# Patient Record
Sex: FEMALE | Race: BLACK OR AFRICAN AMERICAN | Employment: UNEMPLOYED | URBAN - METROPOLITAN AREA
[De-identification: names, ages, dates, MRNs, and addresses within clinical notes are randomized per-mention and may not be internally consistent; named-entity substitution may affect disease eponyms.]

---

## 2017-06-26 ENCOUNTER — HOSPITAL ENCOUNTER (OUTPATIENT)
Age: 42
Setting detail: SPECIMEN
Discharge: HOME | End: 2017-06-26

## 2017-06-27 LAB
C TRACH DNA GENITAL QL NAA+PROBE: NEGATIVE
CYTOLOGY REPORT: NORMAL
HPV SAMPLE: NORMAL
HPV SOURCE: NORMAL
HPV, GENOTYPE 16: NOT DETECTED
HPV, GENOTYPE 18: NOT DETECTED
HPV, HIGH RISK OTHER: NOT DETECTED
HPV, INTERPRETATION: NORMAL
N. GONORRHOEAE DNA: NEGATIVE

## 2018-07-05 PROBLEM — F29 PSYCHOSES (HCC): Status: ACTIVE | Noted: 2018-07-05

## 2019-02-15 ENCOUNTER — HOSPITAL ENCOUNTER (OUTPATIENT)
Age: 44
Setting detail: SPECIMEN
Discharge: HOME | End: 2019-02-15
Payer: MEDICARE

## 2019-02-15 DIAGNOSIS — Z13.29 SCREENING FOR THYROID DISORDER: ICD-10-CM

## 2019-02-15 DIAGNOSIS — Z13.1 SCREENING FOR DIABETES MELLITUS: ICD-10-CM

## 2019-02-15 DIAGNOSIS — Z51.81 MEDICATION MONITORING ENCOUNTER: ICD-10-CM

## 2019-02-15 DIAGNOSIS — Z13.220 SCREENING FOR LIPID DISORDERS: ICD-10-CM

## 2019-02-15 DIAGNOSIS — Z13.21 ENCOUNTER FOR VITAMIN DEFICIENCY SCREENING: ICD-10-CM

## 2019-02-15 DIAGNOSIS — E78.5 DYSLIPIDEMIA: ICD-10-CM

## 2019-02-15 LAB
ALBUMIN SERPL-MCNC: 4.1 G/DL (ref 3.5–5.2)
ALBUMIN/GLOBULIN RATIO: 1.4 (ref 1–2.5)
ALP BLD-CCNC: 76 U/L (ref 35–104)
ALT SERPL-CCNC: 8 U/L (ref 5–33)
ANION GAP SERPL CALCULATED.3IONS-SCNC: 10 MMOL/L (ref 9–17)
AST SERPL-CCNC: 8 U/L
BILIRUB SERPL-MCNC: 0.76 MG/DL (ref 0.3–1.2)
BUN BLDV-MCNC: 6 MG/DL (ref 6–20)
BUN/CREAT BLD: NORMAL (ref 9–20)
CALCIUM SERPL-MCNC: 9 MG/DL (ref 8.6–10.4)
CHLORIDE BLD-SCNC: 106 MMOL/L (ref 98–107)
CHOLESTEROL/HDL RATIO: 3.3
CHOLESTEROL: 137 MG/DL
CO2: 24 MMOL/L (ref 20–31)
CREAT SERPL-MCNC: 0.79 MG/DL (ref 0.5–0.9)
GFR AFRICAN AMERICAN: >60 ML/MIN
GFR NON-AFRICAN AMERICAN: >60 ML/MIN
GFR SERPL CREATININE-BSD FRML MDRD: NORMAL ML/MIN/{1.73_M2}
GFR SERPL CREATININE-BSD FRML MDRD: NORMAL ML/MIN/{1.73_M2}
GLUCOSE FASTING: 89 MG/DL (ref 70–99)
HCT VFR BLD CALC: 44.7 % (ref 36.3–47.1)
HDLC SERPL-MCNC: 42 MG/DL
HEMOGLOBIN: 15 G/DL (ref 11.9–15.1)
LDL CHOLESTEROL: 83 MG/DL (ref 0–130)
MCH RBC QN AUTO: 31.5 PG (ref 25.2–33.5)
MCHC RBC AUTO-ENTMCNC: 33.6 G/DL (ref 28.4–34.8)
MCV RBC AUTO: 93.9 FL (ref 82.6–102.9)
NRBC AUTOMATED: 0 PER 100 WBC
PDW BLD-RTO: 13.2 % (ref 11.8–14.4)
PLATELET # BLD: 201 K/UL (ref 138–453)
PMV BLD AUTO: 9.9 FL (ref 8.1–13.5)
POTASSIUM SERPL-SCNC: 4.6 MMOL/L (ref 3.7–5.3)
RBC # BLD: 4.76 M/UL (ref 3.95–5.11)
SODIUM BLD-SCNC: 140 MMOL/L (ref 135–144)
TOTAL PROTEIN: 7.1 G/DL (ref 6.4–8.3)
TRIGL SERPL-MCNC: 58 MG/DL
TSH SERPL DL<=0.05 MIU/L-ACNC: 1.2 MIU/L (ref 0.3–5)
VITAMIN B-12: 438 PG/ML (ref 232–1245)
VITAMIN D 25-HYDROXY: 14.4 NG/ML (ref 30–100)
VLDLC SERPL CALC-MCNC: NORMAL MG/DL (ref 1–30)
WBC # BLD: 3.3 K/UL (ref 3.5–11.3)

## 2019-09-25 ENCOUNTER — HOSPITAL ENCOUNTER (OUTPATIENT)
Dept: GENERAL RADIOLOGY | Facility: CLINIC | Age: 44
Discharge: HOME | End: 2019-09-27
Payer: MEDICARE

## 2019-09-25 DIAGNOSIS — M54.41 ACUTE MIDLINE LOW BACK PAIN WITH RIGHT-SIDED SCIATICA: ICD-10-CM

## 2019-09-25 PROCEDURE — 72100 X-RAY EXAM L-S SPINE 2/3 VWS: CPT

## 2020-10-07 PROBLEM — E66.01 MORBID OBESITY WITH BMI OF 40.0-44.9, ADULT (HCC): Status: ACTIVE | Noted: 2020-10-07

## 2020-10-07 PROBLEM — F29 PSYCHOSES (HCC): Status: RESOLVED | Noted: 2018-07-05 | Resolved: 2020-10-07

## 2020-10-14 ENCOUNTER — HOSPITAL ENCOUNTER (OUTPATIENT)
Age: 45
Setting detail: SPECIMEN
Discharge: HOME | End: 2020-10-14
Payer: MEDICARE

## 2020-10-14 LAB
ABSOLUTE EOS #: 0.21 K/UL (ref 0–0.44)
ABSOLUTE IMMATURE GRANULOCYTE: <0.03 K/UL (ref 0–0.3)
ABSOLUTE LYMPH #: 1.66 K/UL (ref 1.1–3.7)
ABSOLUTE MONO #: 0.44 K/UL (ref 0.1–1.2)
ALBUMIN SERPL-MCNC: 4 G/DL (ref 3.5–5.2)
ALBUMIN/GLOBULIN RATIO: 1.2 (ref 1–2.5)
ALP BLD-CCNC: 97 U/L (ref 35–104)
ALT SERPL-CCNC: <5 U/L (ref 5–33)
ANION GAP SERPL CALCULATED.3IONS-SCNC: 12 MMOL/L (ref 9–17)
AST SERPL-CCNC: 11 U/L
BASOPHILS # BLD: 1 % (ref 0–2)
BASOPHILS ABSOLUTE: 0.03 K/UL (ref 0–0.2)
BILIRUB SERPL-MCNC: 0.73 MG/DL (ref 0.3–1.2)
BUN BLDV-MCNC: 8 MG/DL (ref 6–20)
BUN/CREAT BLD: ABNORMAL (ref 9–20)
CALCIUM SERPL-MCNC: 9.2 MG/DL (ref 8.6–10.4)
CHLORIDE BLD-SCNC: 105 MMOL/L (ref 98–107)
CHOLESTEROL/HDL RATIO: 3.7
CHOLESTEROL: 157 MG/DL
CO2: 23 MMOL/L (ref 20–31)
CREAT SERPL-MCNC: 0.83 MG/DL (ref 0.5–0.9)
DIFFERENTIAL TYPE: ABNORMAL
EOSINOPHILS RELATIVE PERCENT: 5 % (ref 1–4)
GFR AFRICAN AMERICAN: >60 ML/MIN
GFR NON-AFRICAN AMERICAN: >60 ML/MIN
GFR SERPL CREATININE-BSD FRML MDRD: ABNORMAL ML/MIN/{1.73_M2}
GFR SERPL CREATININE-BSD FRML MDRD: ABNORMAL ML/MIN/{1.73_M2}
GLUCOSE FASTING: 87 MG/DL (ref 70–99)
HCT VFR BLD CALC: 47.4 % (ref 36.3–47.1)
HDLC SERPL-MCNC: 42 MG/DL
HEMOGLOBIN: 15.6 G/DL (ref 11.9–15.1)
IMMATURE GRANULOCYTES: 0 %
LDL CHOLESTEROL: 99 MG/DL (ref 0–130)
LYMPHOCYTES # BLD: 37 % (ref 24–43)
MCH RBC QN AUTO: 31.8 PG (ref 25.2–33.5)
MCHC RBC AUTO-ENTMCNC: 32.9 G/DL (ref 28.4–34.8)
MCV RBC AUTO: 96.5 FL (ref 82.6–102.9)
MONOCYTES # BLD: 10 % (ref 3–12)
NRBC AUTOMATED: 0 PER 100 WBC
PDW BLD-RTO: 13 % (ref 11.8–14.4)
PLATELET # BLD: 249 K/UL (ref 138–453)
PLATELET ESTIMATE: ABNORMAL
PMV BLD AUTO: 9.6 FL (ref 8.1–13.5)
POTASSIUM SERPL-SCNC: 4.5 MMOL/L (ref 3.7–5.3)
RBC # BLD: 4.91 M/UL (ref 3.95–5.11)
RBC # BLD: ABNORMAL 10*6/UL
SEG NEUTROPHILS: 47 % (ref 36–65)
SEGMENTED NEUTROPHILS ABSOLUTE COUNT: 2.16 K/UL (ref 1.5–8.1)
SODIUM BLD-SCNC: 140 MMOL/L (ref 135–144)
TOTAL PROTEIN: 7.3 G/DL (ref 6.4–8.3)
TRIGL SERPL-MCNC: 80 MG/DL
TSH SERPL DL<=0.05 MIU/L-ACNC: 0.94 MIU/L (ref 0.3–5)
VLDLC SERPL CALC-MCNC: NORMAL MG/DL (ref 1–30)
WBC # BLD: 4.5 K/UL (ref 3.5–11.3)
WBC # BLD: ABNORMAL 10*3/UL

## 2021-01-11 ENCOUNTER — HOSPITAL ENCOUNTER (OUTPATIENT)
Dept: GENERAL RADIOLOGY | Facility: CLINIC | Age: 46
Discharge: HOME | End: 2021-01-13
Payer: MEDICARE

## 2021-01-11 DIAGNOSIS — M25.561 ACUTE PAIN OF RIGHT KNEE: ICD-10-CM

## 2021-01-11 PROCEDURE — 73562 X-RAY EXAM OF KNEE 3: CPT

## 2021-03-05 PROBLEM — I10 ESSENTIAL HYPERTENSION: Status: ACTIVE | Noted: 2021-03-05

## 2021-03-05 PROBLEM — F17.210 CIGARETTE SMOKER: Status: ACTIVE | Noted: 2021-03-05

## 2021-10-27 ENCOUNTER — TELEPHONE (OUTPATIENT)
Dept: PHARMACY | Facility: CLINIC | Age: 46
End: 2021-10-27

## 2021-10-27 NOTE — TELEPHONE ENCOUNTER
[facility] CLINICAL PHARMACY REVIEW: ADHERENCE REVIEW  Identified care gap per Reena; fills at [facility]: ACE/ARB adherence    Insurance ID:  [id number]    Last Visit: 21    ASSESSMENT  ACE/ARB ADHERENCE  Per Insurance Records through 10.24.21 :   RX:  Lisinopril 40 mg tab  last filled on 21 for 30 day supply. Next refill due: 21. Fail Date:  10.30.21. Per Reconciled Dispense Report:  (same as above). Per Pharmacy: [facility] [phone]  RX: Lisinopril 40 mg tab   last picked up on 21 for 30 day supply. Refills remainin  Billed through [place]     BP Readings from Last 3 Encounters:   21 (!) 142/88   21 (!) 178/100   21 (!) 132/98     CrCl cannot be calculated (Patient's most recent lab result is older than the maximum 120 days allowed. ). PLAN  The following are interventions that have been identified:     - Patient overdue refilling Lisinopril.    - Need to verify if patient is currently taking Lisinopril 40 mg tab 1 QD (IS on med list). - if yes, will need refill sent to pharmacy. - PCP office visit note dated 21 states RX was discontinued DT SE (tongue swelling) and started on Amlodipine. Reached patient to review. Patient confirmed that she is no longer taking Lisinopril. Will route to PharmD. No future appointments.     Juan Villanueva LPN  Population Health   [facility] [employer] [facility]  Phone: toll free [phone]

## 2021-10-28 RX ORDER — AMLODIPINE BESYLATE 5 MG/1
1 TABLET ORAL DAILY
COMMUNITY
Start: 2021-07-29

## 2021-10-28 NOTE — TELEPHONE ENCOUNTER
[facility] CLINICAL PHARMACY REVIEW: ADHERENCE REVIEW    Updated patient's medication list in Epic - added amlodipine, deleted lisinopril. Patient was supposed to follow-up with cardiology - must see non-[facility] cardiologist as no notes can be viewed in chart - unknown if patient for sure still taking amlodipine.  Will route back to .     Cely Rosas, NitzaD, BCACP, [facility]  Department, toll free: [phone], option 1

## 2021-10-28 NOTE — TELEPHONE ENCOUNTER
For [place] in place:  No   Recommendation Provided To: Pharmacy: 1   Intervention Detail: Discontinued Rx: 1, reason: Patient Preference, SE   Gap Closed?: No    Intervention Accepted By: Pharmacy: 1   Time Spent (min): 15

## 2022-02-11 ENCOUNTER — APPOINTMENT (OUTPATIENT)
Dept: GENERAL RADIOLOGY | Age: 47
End: 2022-02-11
Payer: MEDICARE

## 2022-02-11 ENCOUNTER — HOSPITAL ENCOUNTER (EMERGENCY)
Age: 47
Discharge: HOME | End: 2022-02-11
Attending: STUDENT IN AN ORGANIZED HEALTH CARE EDUCATION/TRAINING PROGRAM
Payer: MEDICARE

## 2022-02-11 VITALS
BODY MASS INDEX: 49.09 KG/M2 | RESPIRATION RATE: 16 BRPM | HEIGHT: 61 IN | DIASTOLIC BLOOD PRESSURE: 128 MMHG | SYSTOLIC BLOOD PRESSURE: 201 MMHG | OXYGEN SATURATION: 98 % | HEART RATE: 85 BPM | TEMPERATURE: 98.3 F | WEIGHT: 260 LBS

## 2022-02-11 DIAGNOSIS — R05.9 COUGH: Primary | ICD-10-CM

## 2022-02-11 DIAGNOSIS — M25.512 LEFT SHOULDER PAIN, UNSPECIFIED CHRONICITY: ICD-10-CM

## 2022-02-11 LAB
EKG ATRIAL RATE: 83 BPM
EKG P AXIS: 63 DEGREES
EKG P-R INTERVAL: 178 MS
EKG Q-T INTERVAL: 370 MS
EKG QRS DURATION: 86 MS
EKG QTC CALCULATION (BAZETT): 434 MS
EKG R AXIS: -4 DEGREES
EKG T AXIS: 33 DEGREES
EKG VENTRICULAR RATE: 83 BPM

## 2022-02-11 PROCEDURE — 99283 EMERGENCY DEPT VISIT LOW MDM: CPT

## 2022-02-11 PROCEDURE — 93005 ELECTROCARDIOGRAM TRACING: CPT | Performed by: STUDENT IN AN ORGANIZED HEALTH CARE EDUCATION/TRAINING PROGRAM

## 2022-02-11 PROCEDURE — 93010 ELECTROCARDIOGRAM REPORT: CPT | Performed by: INTERNAL MEDICINE

## 2022-02-11 PROCEDURE — 73030 X-RAY EXAM OF SHOULDER: CPT

## 2022-02-11 PROCEDURE — 71046 X-RAY EXAM CHEST 2 VIEWS: CPT

## 2022-02-11 RX ORDER — ACETAMINOPHEN 500 MG
1000 TABLET ORAL EVERY 6 HOURS PRN
Qty: 60 TABLET | Refills: 0 | Status: SHIPPED | OUTPATIENT
Start: 2022-02-11

## 2022-02-11 RX ORDER — ALBUTEROL SULFATE 90 UG/1
1-2 AEROSOL, METERED RESPIRATORY (INHALATION) EVERY 4 HOURS PRN
Qty: 18 G | Refills: 0 | Status: SHIPPED | OUTPATIENT
Start: 2022-02-11

## 2022-02-11 RX ORDER — BENZONATATE 100 MG/1
100 CAPSULE ORAL 3 TIMES DAILY PRN
Qty: 30 CAPSULE | Refills: 0 | Status: SHIPPED | OUTPATIENT
Start: 2022-02-11 | End: 2022-02-21

## 2022-02-11 RX ORDER — IBUPROFEN 800 MG/1
800 TABLET ORAL ONCE
Status: DISCONTINUED | OUTPATIENT
Start: 2022-02-11 | End: 2022-02-11

## 2022-02-11 RX ORDER — CYCLOBENZAPRINE HCL 10 MG
10 TABLET ORAL 3 TIMES DAILY PRN
Qty: 21 TABLET | Refills: 0 | Status: SHIPPED | OUTPATIENT
Start: 2022-02-11 | End: 2022-02-21

## 2022-02-11 RX ORDER — IBUPROFEN 600 MG/1
600 TABLET ORAL EVERY 6 HOURS PRN
Qty: 30 TABLET | Refills: 0 | Status: SHIPPED | OUTPATIENT
Start: 2022-02-11

## 2022-02-11 ASSESSMENT — PAIN SCALES - GENERAL: PAINLEVEL_OUTOF10: 10

## 2022-02-13 ASSESSMENT — ENCOUNTER SYMPTOMS
SHORTNESS OF BREATH: 0
EYE REDNESS: 0
COUGH: 1
ABDOMINAL PAIN: 0
COLOR CHANGE: 0
EYE DISCHARGE: 0

## 2022-02-13 NOTE — ED PROVIDER NOTES
[facility]  Emergency Department Encounter     Pt Name: Patricia Chavez  MRN: [id number]  [place] 1975  Date of evaluation: 22  PCP:  Silver Walker, [address] [place] [address]       Chief Complaint   Patient presents with    Cough    Shoulder Pain       HISTORY OFPRESENT ILLNESS  (Location/Symptom, Timing/Onset, Context/Setting, Quality, Duration, Modifying Adrian Mary.)      Patricia Chavez is a 51 y.o. female who presents with Chronic cough ongoing for the past year as well as left shoulder pain which started approximately a week ago and has been gradually worsening. Denies any cardiac history or lung pathology patient is aware of. Does have history of smoking but does not currently smoke. States shoulder pain is left trapezius and left upper paraspinal area. Pain is intermittent. Worse with movement. Worse with palpation. Sharp. Denies any chest pain or shortness of breath. PAST MEDICAL / SURGICAL / SOCIAL / FAMILY HISTORY      has a past medical history of Anxiety, Depression, and Hypertension. has no past surgical history on file.     Social History     Socioeconomic History    Marital status: Single     Spouse name: Not on file    Number of children: Not on file    Years of education: Not on file    Highest education level: Not on file   Occupational History    Not on file   Tobacco Use    Smoking status: Former Smoker     Packs/day: 0.20     Types: Cigarettes     Start date: 10/30/1995     Quit date: 10/30/2019     Years since quittin.2    Smokeless tobacco: Never Used   Substance and Sexual Activity    Alcohol use: No    Drug use: No    Sexual activity: Not on file   Other Topics Concern    Not on file   Social History Narrative    Not on file     Social Determinants of Health     Financial Resource Strain:     Difficulty of Paying Living Expenses: Not on file   Food Insecurity:     Worried About Running Out of Food in the Last Year: Not on file    [address] in the Last Year: Not on file   Transportation Needs:     Lack of Transportation (Medical): Not on file    Lack of Transportation (Non-Medical): Not on file   Physical Activity:     Days of Exercise per Week: Not on file    Minutes of Exercise per Session: Not on file   Stress:     Feeling of Stress : Not on file   Social Connections:     Frequency of Communication with Friends and Family: Not on file    Frequency of Social Gatherings with Friends and Family: Not on file    Attends Yarsani Services: Not on file    Active Member of [address] [place] [address] [employer] or Organizations: Not on file    Attends Club or Organization Meetings: Not on file    Marital Status: Not on file   Intimate Partner Violence:     Fear of Current or Ex-Partner: Not on file    Emotionally Abused: Not on file    Physically Abused: Not on file    Sexually Abused: Not on file   Housing Stability:     Unable to Pay for Housing in the Last Year: Not on file    Number of [place] in the Last Year: Not on file    Unstable Housing in the Last Year: Not on file       History reviewed. No pertinent family history. Allergies:  Penicillins    Home Medications:  Prior to Admission medications    Medication Sig Start Date End Date Taking? Authorizing Provider   CARVEDILOL PO Take by mouth   Yes Historical Provider, MD   albuterol sulfate HFA (PROVENTIL HFA) 108 (90 Base) MCG/ACT inhaler Inhale 1-2 puffs into the lungs every 4 hours as needed for Wheezing or Shortness of Breath (Space out to every 6 hours as symptoms improve) Space out to every 6 hours as symptoms improve.  2/11/22  Yes Tamar Ellsworth DO   acetaminophen (TYLENOL) 500 MG tablet Take 2 tablets by mouth every 6 hours as needed for Pain or Fever 2/11/22  Yes Tamar Ellsworth DO   ibuprofen (ADVIL;MOTRIN) 600 MG tablet Take 1 tablet by mouth every 6 hours as needed for Pain 2/11/22  Yes Tamar Ellsworth, DO   cyclobenzaprine (FLEXERIL) 10 MG tablet Take 1 tablet by mouth 3 times daily as needed for Muscle spasms 2/11/22 2/21/22 Yes Tawny Luna, DO   benzonatate (TESSALON PERLES) 100 MG capsule Take 1 capsule by mouth 3 times daily as needed for Cough 2/11/22 2/21/22 Yes Tawny Luna, DO   paliperidone (INVEGA) 6 MG extended release tablet Take 1 tablet by mouth daily 10/4/20  Yes Historical Provider, MD   amLODIPine (NORVASC) 5 MG tablet Take 1 tablet by mouth daily 7/29/21   Historical Provider, MD       REVIEW OF SYSTEMS    (2-9 systems for level 4, 10 or more for level 5)      Review of Systems   Constitutional: Negative for chills and fever. Eyes: Negative for discharge and redness. Respiratory: Positive for cough. Negative for shortness of breath. Cardiovascular: Negative for chest pain. Gastrointestinal: Negative for abdominal pain. Genitourinary: Negative for flank pain. Musculoskeletal: Negative for myalgias. Skin: Negative for color change and rash. Allergic/Immunologic: Negative for environmental allergies. Neurological: Negative for headaches. Psychiatric/Behavioral: Negative for agitation and confusion. PHYSICAL EXAM   (up to 7 for level 4, 8 or more for level 5)     INITIAL VITALS:    height is 5' 1\" (1.549 m) and weight is 260 lb (117.9 kg). Her oral temperature is 98.3 °F (36.8 °C). Her blood pressure is 201/128 (abnormal) and her pulse is 85. Her respiration is 16 and oxygen saturation is 98%. Physical Exam  Vitals and nursing note reviewed. Constitutional:       Appearance: She is well-developed. HENT:      Head: Normocephalic and atraumatic. Nose: Nose normal.      Mouth/Throat:      Mouth: Mucous membranes are moist.   Eyes:      General: No scleral icterus. Conjunctiva/sclera: Conjunctivae normal.      Pupils: Pupils are equal, round, and reactive to light. Cardiovascular:      Rate and Rhythm: Normal rate and regular rhythm. Heart sounds: Normal heart sounds. No murmur heard. No friction rub.  No gallop. Pulmonary:      Effort: Pulmonary effort is normal. No respiratory distress. Breath sounds: Normal breath sounds. No wheezing or rales. Abdominal:      Palpations: Abdomen is soft. Tenderness: There is no abdominal tenderness. Musculoskeletal:      Comments: Tender palpation over left paraspinal musculature especially in the rhomboid region as well as left trapezius pulses to bilateral upper extremities equal.   Skin:     General: Skin is warm and dry. Findings: No erythema or rash. Neurological:      Mental Status: She is alert and oriented to person, place, and time. Psychiatric:         Behavior: Behavior normal.         DIFFERENTIAL  DIAGNOSIS     PLAN (LABS / IMAGING / EKG):  Orders Placed This Encounter   Procedures    XR SHOULDER LEFT (MIN 2 VIEWS)    XR CHEST (2 VW)    EKG 12 Lead       MEDICATIONS ORDERED:  Orders Placed This Encounter   Medications    DISCONTD: ibuprofen (ADVIL;MOTRIN) tablet 800 mg    albuterol sulfate HFA (PROVENTIL HFA) 108 (90 Base) MCG/ACT inhaler     Sig: Inhale 1-2 puffs into the lungs every 4 hours as needed for Wheezing or Shortness of Breath (Space out to every 6 hours as symptoms improve) Space out to every 6 hours as symptoms improve.      Dispense:  18 g     Refill:  0    acetaminophen (TYLENOL) 500 MG tablet     Sig: Take 2 tablets by mouth every 6 hours as needed for Pain or Fever     Dispense:  60 tablet     Refill:  0    ibuprofen (ADVIL;MOTRIN) 600 MG tablet     Sig: Take 1 tablet by mouth every 6 hours as needed for Pain     Dispense:  30 tablet     Refill:  0    cyclobenzaprine (FLEXERIL) 10 MG tablet     Sig: Take 1 tablet by mouth 3 times daily as needed for Muscle spasms     Dispense:  21 tablet     Refill:  0    benzonatate (TESSALON PERLES) 100 MG capsule     Sig: Take 1 capsule by mouth 3 times daily as needed for Cough     Dispense:  30 capsule     Refill:  0       DDX: ACS versus COPD versus asthma versus chronic bronchitis versus PE    Initial MDM/Plan: 51 y.o. female who presents with chronic cough and rhomboid and left shoulder pain. Pain is reproducible on exam and is worsened with palpation. Suspect musculoskeletal tenderness. Will give symptomatic treatment. EKG and x-rays however symptoms have been ongoing chronic in nature and doubt any new onset ACS. DIAGNOSTIC RESULTS / EMERGENCY DEPARTMENT COURSE / MDM     LABS:  Labs Reviewed - No data to display      RADIOLOGY:  XR SHOULDER LEFT (MIN 2 VIEWS)   Final Result   No acute abnormality. XR CHEST (2 VW)   Final Result   No acute airspace disease identified. EKG  Rhythm: normal sinus   Rate: normal  Axis: normal  Conduction: normal  ST Segments: no acute change  T Waves: no acute change  Q Waves: no acute change    Clinical Impression: no acute change, but this is a nonspecific EKG. All EKG's are interpreted by the Emergency Department Physician who either signs or Co-signs this chart in the absence of a cardiologist.    EMERGENCY DEPARTMENT COURSE:     Imaging negative EKG nonspecific. Will have patient follow-up with primary care doctor. Return for worsening signs or symptoms specially chest pain shortness of breath. · Based on the low acuity of concerning symptoms and improvement of symptoms, patient will be discharged with follow up and prescription information listed in the Disposition section. · Patient states they will follow-up with primary care physician and/or return to the emergency department should they experience a change or worsening of symptoms. · Patient will be discharged with resources: summary of visit, instructions, follow-up information, prescriptions if necessary. · Patient/ family instructed to read discharge paperwork. All of their questions and concerns were addressed. · Suspicion for any acute life-threatening processes is low.    Patient voices understanding of plan.        PROCEDURES:  None    CONSULTS:  None    CRITICAL CARE:  0    FINAL IMPRESSION      1. Cough    2.  Left shoulder pain, unspecified chronicity          DISPOSITION / PLAN     DISPOSITION Decision To Discharge 02/11/2022 06:10:59 AM    Discharge    PATIENTREFERRED TO:  Anika Garay   [address] [id number]  [phone]    Schedule an appointment as soon as possible for a visit in 1 week  As needed      DISCHARGE MEDICATIONS:  Discharge Medication List as of 2/11/2022  6:23 AM      START taking these medications    Details   albuterol sulfate HFA (PROVENTIL HFA) 108 (90 Base) MCG/ACT inhaler Inhale 1-2 puffs into the lungs every 4 hours as needed for Wheezing or Shortness of Breath (Space out to every 6 hours as symptoms improve) Space out to every 6 hours as symptoms improve., Disp-18 g, R-0Print      acetaminophen (TYLENOL) 500 MG tablet Take 2 tablets by mouth every 6 hours as needed for Pain or Fever, Disp-60 tablet, R-0Print      ibuprofen (ADVIL;MOTRIN) 600 MG tablet Take 1 tablet by mouth every 6 hours as needed for Pain, Disp-30 tablet, R-0Print      cyclobenzaprine (FLEXERIL) 10 MG tablet Take 1 tablet by mouth 3 times daily as needed for Muscle spasms, Disp-21 tablet, R-0Print      benzonatate (TESSALON PERLES) 100 MG capsule Take 1 capsule by mouth 3 times daily as needed for Cough, Disp-30 capsule, R-0Print             Chantel Dey DO  EmergencyMedicine Attending    (Please note that portions of this note were completed with a voice recognition program.  Efforts were made to edit the dictations but occasionally words are mis-transcribed.)       Chantel Dey DO  02/13/22 [id number]

## 2022-03-01 ENCOUNTER — HOSPITAL ENCOUNTER (OUTPATIENT)
Dept: PHYSICAL THERAPY | Facility: CLINIC | Age: 47
Setting detail: THERAPIES SERIES
Discharge: HOME | End: 2022-03-01
Payer: MEDICARE

## 2022-03-01 PROCEDURE — 97140 MANUAL THERAPY 1/> REGIONS: CPT

## 2022-03-01 PROCEDURE — 97110 THERAPEUTIC EXERCISES: CPT

## 2022-03-01 PROCEDURE — 97161 PT EVAL LOW COMPLEX 20 MIN: CPT

## 2022-03-01 NOTE — CONSULTS
except: [] Currently independent with all except:     Bathing  [] Assist [] Assist     Dress/grooming [] Assist [] Assist     Transfer/mobility [] Assist [] Assist     Feeding [] Assist [] Assist     Toileting [] Assist [] Assist     Driving [] Assist [] Assist     Housekeeping [] Assist [] Assist     Grocery shop/meal prep [] Assist [] Assist     *overhead movements are difficult    Gait Prior level of function Current level of function    [x] Independent  [] Assist [x] Independent  [] Assist   Device: [x] Independent [x] Independent    [] Straight Cane [] Quad cane [] Straight Cane [] Quad cane    [] Standard walker [] Rolling walker   [] 4 wheeled walker [] Standard walker [] Rolling walker   [] 4 wheeled walker    [] Wheelchair [] Wheelchair       Medications: [x] Refer to full medical record [] None [] Other:  Allergies:      [x] Refer to full medical record [] None [] Other:    Function:  Hand Dominance  [x] Right  [] Left  Marital Status Lives alone   Employement N/A   Recreational Activities Make flowers        Pain present?  Yes, soreness   Location Upper back on the L  Shoulder down to wrist   Pain Rating currently 4/10   Pain at worst 10/10   Pain at best 2/10   Description of pain Dull, numbing, sore   Altered Sensation yes   What makes it worse Reaching, sleeping, driving   What makes it better Muscle relaxers, ibuprofen, tylenol   Symptom progression symptomss have gotten better since taken muscle relaxers   Sleep Yes, with taking tylenol            Objective:      STRENGTH    Left Right   C5 Shld Abd 5 5   Shld Flexion 5 5   Shld IR 4+ 4+   Shld ER 4+ 4+   C6 Elb Flex 5 5   C7 Elb Ext 5 5    4 4   Scaption 4+ 4+        Middle Trap 4+ 4+   Lower Trap 4+ 4+   Rhomboid 4 4                AROM    Left Right   Shld Abd WNL WNL   Shld Flex WNL WNL   Shld IR WNL WNL   Shld ER          ROM   Cervical    Flexion 25% limited   Extension 50% limited   Rotation L 25% limited R 25% limited   Sidebend L 25% limited R 25% limited           OBSERVATION No Deficit Deficit Not Tested Comments   Posture       Forward Head [] [x] []    Rounded Shoulders [] [x] []    Kyphosis [] [x] [] Excessive thoracic kyphosis   Lordosis [] [x] [] Decreased lumbar lordosis   Lateral Shift [x] [] []    Scoliosis [x] [] []    Iliac Crest [x] [] []    PSIS [x] [] []    ASIS [x] [] []    Palpation [] [x] [] + tenderness on L UT, upper thoracic paraspinals and posterior tib angles   Sensation [x] [] [] Dermatome key points on bilateral UE showed no deficit   Edema [x] [] []    Neurological [x] [] []        Somatic Dysfunctions Normal Deficit Details   Cervical   [x] []    Thoracic   [] [x] FRSL T4-T8   Rib   [] [x] L Rib 3-4 posterior rib angle       Flexibility Normal LUE Deficit RUE Deficit   UTrap [] [x] [x]       FUNCTION Normal Difficult Unable   Sitting [x] [] []   Standing [x] [] []   Ambulation [x] [] []   Groom/Dress [x] [] []   Lift/Carry [x] [] []   Stairs [x] [] []   Bending [x] [] []   OH reach [x] [] []   Sit to Stand [x] [] []       Functional Test: QuickDash Score: 27% functionally impaired       Assessment:    Patient is a 51year old female who presents to the clinic with a chief complaint of L shoulder pain with an insidious onset. Patient presents with upper to mid thoracic somatic dysfunctions and tenderness on the posterior aspect of the L thoracic area. Patient presents with overall decreased UE strength and postural awareness. Patient would benefit from skilled physical therapy services in order to: decrease L UE pain at rest and with activities, improve cervical ROM and decrease UT muscle tension, improve postural muscle and shoulder strength and improve overall function. Problems:    [x] ? Pain: increased L UE pain and tenderness to palpation  [x] ? ROM: decreased cervical ROM in all directions  [x] ? Strength: decreased postural muscle strength, decreased shoulder musculature strength  [x] ?  Function: 27% functional impairment on QuickDash  [] Other:        Goals  MET NOT MET ON-  GOING  Details   Date Addressed:        STG: To be met in 10 treatments           1. ? Pain: Decrease L shoulder pain levels to 4/10 with ADL/sport  []  []  []      2. ? ROM: Increase UT flexibility and cervicaAROM limitations throughout to equal bilat to reduce difficulty with ADLs []  []  []      3. ? Strength: Increase MMT to 5/5 throughout to ease functional limitations and mobility  []  []  []     4. Independent with Home Exercise Programs []  []  []      []  []  []     Date Addressed:        LTG: To be met in 20 treatments       1. Improve score on assessment tool QuickDas from 27% impairment to less than 13% impairment  []  []  []     2. Reduce pain levels to 1-2/10 or less with ADLs/sport []  []  []      []  []  []                Patient goals: receive wholesome care, improve condition    Rehab Potential:  [x] Good  [] Fair  [] Poor   Suggested Professional Referral:  [x] No  [] Yes:  Barriers to Goal Achievement[de-identified]  [x] No  [] Yes:  Domestic Concerns:  [x] No  [] Yes:    Pt. Education:  [x] Plans/Goals, Risks/Benefits discussed  [x] Home exercise program  Method of Education: [x] Verbal  [x] Demo  [x] Written  Comprehension of Education:  [x] Verbalizes understanding. [] Demonstrates understanding. [x] Needs Review. [] Demonstrates/verbalizes understanding of HEP/Ed previously given. Access Code: [email or web address]  URL: [email or web address]  Date: 03/01/2022  Prepared by: Karla Zamora    Exercises  Seated Upper Trapezius Stretch - 1 x daily - 7 x weekly - 3 sets - 30 (sec) hold  Seated Scapular Retraction - 1 x daily - 7 x weekly - 10 reps - 5 (sec) hold  Corner Pec Major Stretch - 1 x daily - 7 x weekly - 3 sets - 30 (sec) hold    Patient Education  Office Posture      Treatment Plan:  [x] Therapeutic Exercise    [] Modalities:  [] Therapeutic Activity    [] Ultrasound  [] Electrical Stimulation  [] Gait Training     [] Lumbar/Cervical Traction  [x] Neuromuscular Re-education [] Cold/hotpack [] Iontophoresis: 4 mg/mL  [x] Instruction in HEP              Dexamethasone Sodium Phosphate 40-80 mAmin  [x] Manual Therapy             []  Aquatic Therapy       [x] Vasocompression: Maryam Ego  [] Other:    []  Medication allergies reviewed for use of    Dexamethasone Sodium Phosphate 4mg/ml     with iontophoresis treatments. Pt is not allergic. Frequency:  2 x/week for 20 visits      Todays Treatment:       Exercises:  Exercise    L Shoulder Pain Reps/ Time Weight/ Level Comments         Bike            Serratus Slides      TB Horizontal Abd      TB Bilat ER      TB Rows      TB Ext                  Corner Pec Stretch 3x30\"  HEP   Seated Scap retractions 10x5\"  HEP   UT Stretch 3x30\"  HEP   Levator Stretch 3x30\"     Chin tucks             Other:       Somatic Dysfunctions Normal Deficit Details   Cervical   [x] []    Thoracic   [] [x] FRSL T4-T8-MET, MOB   Rib   [] [x] L rib post-MOB   FRS=flexed, rotated, side-bent  ERS=extended, rotated, side-bent   MOB=Mobilization  MFR=Myofascial Release  MET=Muscle Energy Technique  MFR=Myofascial Release          Specific Instructions for next treatment: manual, therex as needed       Evaluation Complexity:  History (Personal factors, comorbidities) [] 0 [x] 1-2 [] 3+   Exam (limitations, restrictions) [] 1-2 [x] 3 [] 4+   Clinical presentation (progression) [x] Stable [] Evolving  [] Unstable   Decision Making [x] Low [] Moderate [] High    [x] Low Complexity [] Moderate Complexity [] High Complexity       Treatment Charges: Mins Units   [x] Evaluation       [x]  Low       []  Moderate       []  High 20 1   []  Modalities     [x]  Ther Exercise 10 1   [x]  Manual Therapy 10 1   []  Ther Activities     []  Aquatics     []  Vasocompression     []  Other       TOTAL TREATMENT TIME: 40 minutes    Time in: 910      Time out: 1000    Electronically signed by: LIA Lawson.    This document has been reviewed and approved by Ana Rosa Sorenson PT        Physician Signature:________________________________Date:__________________  By signing above or cosigning this note, I have reviewed this plan of care and certify a need for medically necessary rehabilitation services.      *PLEASE SIGN ABOVE AND FAX BACK ALL PAGES*

## 2022-03-03 ENCOUNTER — HOSPITAL ENCOUNTER (OUTPATIENT)
Dept: PHYSICAL THERAPY | Facility: CLINIC | Age: 47
Setting detail: THERAPIES SERIES
Discharge: HOME | End: 2022-03-03
Payer: MEDICARE

## 2022-03-03 PROCEDURE — 97110 THERAPEUTIC EXERCISES: CPT

## 2022-03-03 NOTE — FLOWSHEET NOTE
[x] formerly [facility]  Outpatient Rehabilitation &  Therapy  [facility]   [place]: [phone]  F: [phone]      Physical Therapy Daily Treatment Note    Date:  3/3/2022  Patient Name:  Joseph Turner    :  1975  MRN: [id number]  Physician: Indira Pinon DO       Insurance: [facility] Medicare (Visits based on medical necessity, $20 Copay)  Medical Diagnosis: Acute L shoulder pain  Rehab Codes: M25.512 (Acute pain of left shoulder),   Onset Date: 2022                          Next 's appt.: TBD  Visit# / total visits: 2/10; Progress note for Medicare patient due at visit 10     Cancels/No Shows: 0/0     Subjective:    Pain:  [] Yes  [x] No Location: N/A  Pain Rating: (0-10 scale) 0/10  Pain altered Tx:  [x] No  [] Yes  Action:  Comments: Pt arrives 10 minutes late, able to accommodate. Pt stating no pain or numbness in fingers and feels good today. Pt states she has been compliant with HEP. Todays Treatment:                                 Exercises:  Exercise     L Shoulder Pain Reps/ Time Weight/ Level Comments             UE Bike  10'             Scapular retractions 10x5\"  HEP   Corner pect stretch 3x30\" T/Y HEP   UT stretch  3x30\"  HEP   Levator stretch  3x30\"     Serratus Wall Slides 2x10             TBand HAB x15  orange     TBand Bilat ER x15   orange  HEP   TBand Rows x15  orange  HEP   TBand Ext x15  orange  HEP                                         Other:     Specific Instructions for subsequent treatments: Supine SA punches, SL abduction/ER    Treatment Charges: Mins Units   []  Modalities     [x]  Ther Exercise 32 2   []  Manual Therapy     []  Ther Activities     []  Aquatics     []  Vasocompression     []  Other     Total Treatment time 32 2       Assessment: [x] Progressing toward goals. Pt with good tolerance to program requiring cues to correct form with good carryover.  Administered HEP handout to perform UE thera-band exercises, pt understands to perform 1-2x daily. Will continue to progress UE strengthening and promote postural exercises. [] No change. [] Other:  [x] Patient would continue to benefit from skilled physical therapy services in order to: decrease L UE pain at rest and with activities, improve cervical ROM and decrease UT muscle tension, improve postural muscle and shoulder strength and improve overall function. STG/LTG  Goals  MET NOT MET ON-  GOING  Details   Date Addressed:            STG: To be met in 10 treatments  ?          1. ? Pain: Decrease L shoulder pain levels to 4/10 with ADL/sport  []? ?  []??  []??      2. ? ROM: Increase UT flexibility and cervicaAROM limitations throughout to equal bilat to reduce difficulty with ADLs []? ?  []??  []??      3. ? Strength: Increase MMT to 5/5 throughout to ease functional limitations and mobility  []? ?  []??  []??      4. Independent with Home Exercise Programs []? ?  []??  []??        []? ?  []??  []??      Date Addressed:            LTG: To be met in 20 treatments           1. Improve score on assessment tool QuickDas from 27% impairment to less than 13% impairment  []??  []??  []??      2. Reduce pain levels to 1-2/10 or less with ADLs/sport []? ?  []??  []??        []? ?  []??  []??                        Patient goals: receive wholesome care, improve condition     Rehab Potential:  [x]? Good  []? Fair  []? Poor    Suggested Professional Referral:  [x]? No  []? Yes:  Barriers to Goal Achievement[de-identified]  [x]? No  []? Yes:  Domestic Concerns:  [x]? No  []? Yes:     Pt. Education:  [x] Yes  [] No  [x] Reviewed Prior HEP/Ed  Method of Education: [x] Verbal  [x] Demo  [x] Written    Access Code: [email or web address]  URL: [email or web address]  Date: 03/03/2022  Prepared by: Sahra James    Exercises  Standing Shoulder Row with Anchored Resistance - 1 x daily - 7 x weekly - 15 reps  Shoulder External Rotation and Scapular Retraction with Resistance - 1 x daily - 7 x weekly - 15 reps  Shoulder extension with resistance - Neutral - 1 x daily - 7 x weekly - 15 reps    Comprehension of Education:  [x] Verbalizes understanding. [x] Demonstrates understanding. [] Needs review. [x] Demonstrates/verbalizes HEP/Ed previously given. Plan: [x] Continue current frequency toward long and short term goals. [x] Specific Instructions for subsequent treatments: see above.       Time In: 9:10am           Time Out: 9:42am    Electronically signed by:  Adela Andino PTA

## 2022-03-08 ENCOUNTER — HOSPITAL ENCOUNTER (OUTPATIENT)
Dept: PHYSICAL THERAPY | Facility: CLINIC | Age: 47
Setting detail: THERAPIES SERIES
Discharge: HOME | End: 2022-03-08
Payer: MEDICARE

## 2022-03-08 PROCEDURE — 97110 THERAPEUTIC EXERCISES: CPT

## 2022-03-08 NOTE — FLOWSHEET NOTE
[x] [facility]  Outpatient Rehabilitation &  Therapy  [facility]   [place]: [phone]  F: [phone]      Physical Therapy Daily Treatment Note    Date:  3/8/2022  Patient Name:  Elly Bermudez    :  1975  MRN: [id number]  Physician: Jeane Whitehead DO       Insurance: [facility] Medicare (Visits based on medical necessity, $20 Copay)  Medical Diagnosis: Acute L shoulder pain  Rehab Codes: M25.512 (Acute pain of left shoulder),   Onset Date: 2022                          Next 's appt.: TBD  Visit# / total visits: 3/10; Progress note for Medicare patient due at visit 10     Cancels/No Shows: 0/0     Subjective:    Pain:  [] Yes  [x] No Location: N/A  Pain Rating: (0-10 scale) 0/10  Pain altered Tx:  [x] No  [] Yes  Action:  Comments: Patient arrives today with no pain. Patient notes some occasional soreness but noticed significant improvement in condition. Todays Treatment:                                 Exercises:  Exercise     L Shoulder Pain Reps/ Time Weight/ Level Comments             UE Bike  10'             Scapular retractions 10x5\"  HEP   Corner pect stretch 3x30\" T/Y HEP   UT stretch  3x30\"  HEP   Levator stretch  3x30\"     Serratus Wall Slides 2x10       Serratus punches supine      SL ER/Abd            TBand HAB x15  lime     TBand Bilat ER x15   lime  HEP   TBand Rows x15  lime  HEP   TBand Ext x15  lime  HEP              Standing         flexion 2x10        scaption  2x10       Other:     Specific Instructions for subsequent treatments: add prone scapular exercises, focus on eccentric control    Treatment Charges: Mins Units   []  Modalities     [x]  Ther Exercise 30 2   []  Manual Therapy     []  Ther Activities     []  Aquatics     []  Vasocompression     []  Other     Total Treatment time 30 2       Assessment: [x] Progressing toward goals. Patient demonstrates good exercise recall requiring only minimal instruction for proper form.  Added SL ER and ABD along with SA punches in order to continue strengthening scapular and shoulder girdle musculature. Progressed band resistance for standing exercises with no complaints of increased pain. Added standing flexion and scaption exercises with no resistance in order to introduce motion and work on decreased compensatory muscle activation. Patient requires increased cueing for improving eccentric control. [] No change. [] Other:  [x] Patient would continue to benefit from skilled physical therapy services in order to: decrease L UE pain at rest and with activities, improve cervical ROM and decrease UT muscle tension, improve postural muscle and shoulder strength and improve overall function. STG/LTG  Goals  MET NOT MET ON-  GOING  Details   Date Addressed:            STG: To be met in 10 treatments  ?          1. ? Pain: Decrease L shoulder pain levels to 4/10 with ADL/sport  []? ?  []??  []??      2. ? ROM: Increase UT flexibility and cervicaAROM limitations throughout to equal bilat to reduce difficulty with ADLs []? ?  []??  []??      3. ? Strength: Increase MMT to 5/5 throughout to ease functional limitations and mobility  []? ?  []??  []??      4. Independent with Home Exercise Programs []? ?  []??  []??        []? ?  []??  []??      Date Addressed:            LTG: To be met in 20 treatments           1. Improve score on assessment tool QuickDas from 27% impairment to less than 13% impairment  []??  []??  []??      2. Reduce pain levels to 1-2/10 or less with ADLs/sport []? ?  []??  []??        []? ?  []??  []??                        Patient goals: receive wholesome care, improve condition     Rehab Potential:  [x]? Good  []? Fair  []? Poor    Suggested Professional Referral:  [x]? No  []? Yes:  Barriers to Goal Achievement[de-identified]  [x]? No  []? Yes:  Domestic Concerns:  [x]? No  []? Yes:     Pt. Education:  [x] Yes  [] No  [x] Reviewed Prior HEP/Ed.  Educated patient on decreasing compensatory muscle activation and proper postural positioning throughout the day. Method of Education: [x] Verbal  [x] Demo  [x] Written    Access Code: [email or web address]  URL: [employer]/  Date: 03/03/2022  Prepared by: Derik Bratlett    Exercises  Standing Shoulder Row with Anchored Resistance - 1 x daily - 7 x weekly - 15 reps  Shoulder External Rotation and Scapular Retraction with Resistance - 1 x daily - 7 x weekly - 15 reps  Shoulder extension with resistance - Neutral - 1 x daily - 7 x weekly - 15 reps    Comprehension of Education:  [x] Verbalizes understanding. [x] Demonstrates understanding. [] Needs review. [x] Demonstrates/verbalizes HEP/Ed previously given. Plan: [x] Continue current frequency toward long and short term goals. [x] Specific Instructions for subsequent treatments: see above. Time In: 0900         Time Out: 0940    Electronically signed by:  LIA Monzon.    This document has been reviewed and approved by [address], PT.

## 2022-03-10 ENCOUNTER — HOSPITAL ENCOUNTER (OUTPATIENT)
Dept: PHYSICAL THERAPY | Facility: CLINIC | Age: 47
Setting detail: THERAPIES SERIES
Discharge: HOME | End: 2022-03-10
Payer: MEDICARE

## 2022-03-10 PROCEDURE — 97110 THERAPEUTIC EXERCISES: CPT

## 2022-03-10 NOTE — FLOWSHEET NOTE
[x] [facility]  Outpatient Rehabilitation &  Therapy  [facility]   [place]: [phone]  F: [phone]      Physical Therapy Daily Treatment Note    Date:  3/10/2022  Patient Name:  Jacqueline Zhang    :  1975  MRN: [id number]  Physician: Precious Heard DO       Insurance: [facility] Medicare (Visits based on medical necessity, $20 Copay)  Medical Diagnosis: Acute L shoulder pain  Rehab Codes: M25.512 (Acute pain of left shoulder),   Onset Date: 2022                          Next 's appt.: TBD  Visit# / total visits: 4/10; Progress note for Medicare patient due at visit 10     Cancels/No Shows: 0/0     Subjective:    Pain:  [] Yes  [x] No Location: N/A  Pain Rating: (0-10 scale) 0/10  Pain altered Tx:  [x] No  [] Yes  Action:  Comments: Patient arrives today with no pain. Patient states she had some tenderness after the previous session but not significant pain after or on this date. Patient states that she has not been taking tylenol anymore. Todays Treatment:                                 Exercises:  Exercise     L Shoulder Pain Reps/ Time Weight/ Level Comments             UE Bike  10'             Scapular retractions 10x5\"  HEP   Corner pect stretch 3x30\" T/Y HEP   UT stretch  3x30\"  HEP   Levator stretch  3x30\"     Serratus Wall Slides 2x10  orange     Serratus punches supine 2x10 1#    SL ER/Abd 2x10 1#          TBand HAB x15  lime     TBand Bilat ER x15   lime  HEP   TBand Rows x15  lime  HEP   TBand Ext x15  lime  HEP              Standing         flexion 2x10  1#      scaption  2x10  1#     Other:     Specific Instructions for subsequent treatments: add prone scapular exercises, focus on eccentric control    Treatment Charges: Mins Units   []  Modalities     [x]  Ther Exercise 25 2   []  Manual Therapy     []  Ther Activities     []  Aquatics     []  Vasocompression     []  Other     Total Treatment time 25 2       Assessment: [x] Progressing toward goals. Progressed standing exercises this date with increasing resistance to 1# bilaterally and patient demonstrating good form and no compensatory movement. Patient requires moderate verbal instruction for maintaining eccentric control with patient demonstrating fair return. Added orange resistance band for serratus slides with patient noting fatigue at end of repetitions but no increase in pain. Patient requires cueing for recall of new additional exercises from previous session but able to complete all exercises with no complaints of pain. [] No change. [] Other:  [x] Patient would continue to benefit from skilled physical therapy services in order to: decrease L UE pain at rest and with activities, improve cervical ROM and decrease UT muscle tension, improve postural muscle and shoulder strength and improve overall function. STG/LTG  Goals  MET NOT MET ON-  GOING  Details   Date Addressed:            STG: To be met in 10 treatments  ?          1. ? Pain: Decrease L shoulder pain levels to 4/10 with ADL/sport  []? ?  []??  []??      2. ? ROM: Increase UT flexibility and cervicaAROM limitations throughout to equal bilat to reduce difficulty with ADLs []? ?  []??  []??      3. ? Strength: Increase MMT to 5/5 throughout to ease functional limitations and mobility  []? ?  []??  []??      4. Independent with Home Exercise Programs []? ?  []??  []??        []? ?  []??  []??      Date Addressed:            LTG: To be met in 20 treatments           1. Improve score on assessment tool QuickDas from 27% impairment to less than 13% impairment  []??  []??  []??      2. Reduce pain levels to 1-2/10 or less with ADLs/sport []? ?  []??  []??        []? ?  []??  []??                        Patient goals: receive wholesome care, improve condition     Rehab Potential:  [x]? Good  []? Fair  []? Poor    Suggested Professional Referral:  [x]? No  []? Yes:  Barriers to Goal Achievement[de-identified]  [x]? No  []? Yes:  Domestic Concerns:  [x]?  No []? Yes:     Pt. Education:  [x] Yes  [] No  [x] Reviewed Prior HEP/Ed. Educated patient on continuance of HEP and administered a green resistance band to continue progressions at home. Method of Education: [x] Verbal  [x] Demo  [x] Written    Access Code: [email or web address]  URL: [email or web address]  Date: 03/03/2022  Prepared by: Ej Matta    Exercises  Standing Shoulder Row with Anchored Resistance - 1 x daily - 7 x weekly - 15 reps  Shoulder External Rotation and Scapular Retraction with Resistance - 1 x daily - 7 x weekly - 15 reps  Shoulder extension with resistance - Neutral - 1 x daily - 7 x weekly - 15 reps    Comprehension of Education:  [x] Verbalizes understanding. [x] Demonstrates understanding. [] Needs review. [x] Demonstrates/verbalizes HEP/Ed previously given. Plan: [x] Continue current frequency toward long and short term goals. [x] Specific Instructions for subsequent treatments: see above. Time In: 4632         Time Out: 3614     Electronically signed by:  LIA Silveira.    This document has been reviewed and approved by [address] [facility], PT.

## 2022-03-15 ENCOUNTER — HOSPITAL ENCOUNTER (OUTPATIENT)
Dept: PHYSICAL THERAPY | Facility: CLINIC | Age: 47
Setting detail: THERAPIES SERIES
Discharge: HOME | End: 2022-03-15
Payer: MEDICARE

## 2022-03-15 PROCEDURE — 97110 THERAPEUTIC EXERCISES: CPT

## 2022-03-15 PROCEDURE — 97140 MANUAL THERAPY 1/> REGIONS: CPT

## 2022-03-15 NOTE — FLOWSHEET NOTE
subsequent treatments: add prone scapular exercises, focus on eccentric control    Treatment Charges: Mins Units   []  Modalities     [x]  Ther Exercise 30 2   [x]  Manual Therapy 8 1   []  Ther Activities     []  Aquatics     []  Vasocompression     []  Other     Total Treatment time 38 3       Assessment: [x] Progressing toward goals. Assessed for somatic dysfunction on this date with patient presenting with an ERSL in cervical segments and no thoracic or rib dysfunctions found on this date. Patient reports an improvement of symptoms following manual therapy and median nerve glides followed directly after to continue to improve patient reported symptoms. Patient continues to demonstrated rounded shoulder posture and emphasized proper posture for overall symptom reduction. Patient requires minimal cueing for exercise recall and proper technique throughout banded exercises. Progressed serratus punches to 3 way to continue improving multi-directional scapular stability. [] No change. [] Other:  [x] Patient would continue to benefit from skilled physical therapy services in order to: decrease L UE pain at rest and with activities, improve cervical ROM and decrease UT muscle tension, improve postural muscle and shoulder strength and improve overall function. STG/LTG  Goals  MET NOT MET ON-  GOING  Details   Date Addressed:            STG: To be met in 10 treatments  ?          1. ? Pain: Decrease L shoulder pain levels to 4/10 with ADL/sport  []? ?  []??  []??      2. ? ROM: Increase UT flexibility and cervicaAROM limitations throughout to equal bilat to reduce difficulty with ADLs []? ?  []??  []??      3. ? Strength: Increase MMT to 5/5 throughout to ease functional limitations and mobility  []? ?  []??  []??      4. Independent with Home Exercise Programs []? ?  []??  []??        []? ?  []??  []??      Date Addressed:            LTG: To be met in 20 treatments           1.  Improve score on assessment tool [employer] from 27% impairment to less than 13% impairment  []??  []??  []??      2. Reduce pain levels to 1-2/10 or less with ADLs/sport []? ?  []??  []??        []? ?  []??  []??                        Patient goals: receive wholesome care, improve condition     Rehab Potential:  [x]? Good  []? Fair  []? Poor    Suggested Professional Referral:  [x]? No  []? Yes:  Barriers to Goal Achievement[de-identified]  [x]? No  []? Yes:  Domestic Concerns:  [x]? No  []? Yes:     Pt. Education:  [x] Yes  [] No  [x] Reviewed Prior HEP/Ed. Educated patient on continuance of HEP and administered handout regarding median nerve glides. Method of Education: [x] Verbal  [x] Demo  [x] Written  Access Code: [email or web address]  URL: [email or web address]/  Date: 03/15/2022  Prepared by: Peter Ayala    Exercises  Median Nerve Flossing - Tray - 1 x daily - 7 x weekly - 3 sets - 10 reps  Standing Median Nerve Glide - 1 x daily - 7 x weekly - 3 sets - 10 reps    Access Code: [email or web address]  URL: [employer]/  Date: 03/03/2022  Prepared by: Ej Matta    Exercises  Standing Shoulder Row with Anchored Resistance - 1 x daily - 7 x weekly - 15 reps  Shoulder External Rotation and Scapular Retraction with Resistance - 1 x daily - 7 x weekly - 15 reps  Shoulder extension with resistance - Neutral - 1 x daily - 7 x weekly - 15 reps    Comprehension of Education:  [x] Verbalizes understanding. [x] Demonstrates understanding. [] Needs review. [x] Demonstrates/verbalizes HEP/Ed previously given. Plan: [x] Continue current frequency toward long and short term goals. [x] Specific Instructions for subsequent treatments: see above. Time In: 0902         Time Out:  0667    Electronically signed by:  LIA Silveira.    This document has been reviewed and approved by [address], PT.

## 2022-03-17 ENCOUNTER — APPOINTMENT (OUTPATIENT)
Dept: PHYSICAL THERAPY | Facility: CLINIC | Age: 47
End: 2022-03-17
Payer: MEDICARE

## 2022-06-16 ENCOUNTER — HOSPITAL ENCOUNTER (OUTPATIENT)
Dept: PULMONOLOGY | Age: 47
Discharge: HOME | End: 2022-06-16
Payer: MEDICARE

## 2022-06-16 DIAGNOSIS — R05.9 COUGH: ICD-10-CM

## 2022-06-16 DIAGNOSIS — F17.210 CIGARETTE SMOKER: ICD-10-CM

## 2022-06-16 PROCEDURE — 6370000000 HC RX 637 (ALT 250 FOR IP): Performed by: FAMILY MEDICINE

## 2022-06-16 PROCEDURE — 94060 EVALUATION OF WHEEZING: CPT

## 2022-06-16 RX ORDER — ALBUTEROL SULFATE 90 UG/1
2 AEROSOL, METERED RESPIRATORY (INHALATION) ONCE
Status: COMPLETED | OUTPATIENT
Start: 2022-06-16 | End: 2022-06-16

## 2022-06-16 RX ADMIN — ALBUTEROL SULFATE 2 PUFF: 90 AEROSOL, METERED RESPIRATORY (INHALATION) at 07:43

## 2022-06-21 NOTE — PROCEDURES
[id number] [facility] [address]                [address] [place] [address], [address] [place] [address]                               PULMONARY FUNCTION    PATIENT NAME: Lea Gutierrez                     :        1975  MED REC NO:   [id number]                             ROOM:  ACCOUNT NO:   [de-identified]                           ADMIT DATE: 2022  PROVIDER:     Tori Morales MD    DATE OF PROCEDURE:  2022    TYPE OF STUDY:  Spirometry with bronchodilator testing. RESULTS:  The patient had FEV1 of 1.56 which is mildly reduced at 71% of  predicted. FVC was 1.94 which is mildly reduced at 72% of predicted. FEV1/FVC was 81. The mid-flows were moderately reduced at 65% of  predicted. Following bronchodilators, there was no significant change  in the flows. IMPRESSION:  Mild restriction suggested by spirometry with no  significant change in the flows following bronchodilators. Reduced  mid-flows may indicate small airways disease. Clinical correlation is  recommended.         Missael Mario MD    D: 2022 7:00:15       T: 2022 9:57:28     [id number]  Job#: [id number]     Doc#: [id number]    CC:

## 2023-02-07 ENCOUNTER — HOSPITAL ENCOUNTER (OUTPATIENT)
Dept: MAMMOGRAPHY | Age: 48
Discharge: HOME | End: 2023-02-09
Payer: MEDICARE

## 2023-02-07 DIAGNOSIS — Z12.31 SCREENING MAMMOGRAM FOR BREAST CANCER: ICD-10-CM

## 2023-02-07 PROCEDURE — 77063 BREAST TOMOSYNTHESIS BI: CPT

## 2023-03-07 ENCOUNTER — HOSPITAL ENCOUNTER (OUTPATIENT)
Dept: MAMMOGRAPHY | Age: 48
Discharge: HOME | End: 2023-03-09
Payer: MEDICARE

## 2023-03-07 ENCOUNTER — HOSPITAL ENCOUNTER (OUTPATIENT)
Dept: ULTRASOUND IMAGING | Age: 48
Discharge: HOME | End: 2023-03-09
Payer: MEDICARE

## 2023-03-07 DIAGNOSIS — R92.8 ABNORMAL MAMMOGRAM OF BOTH BREASTS: ICD-10-CM

## 2023-03-07 DIAGNOSIS — N63.10 MASS OF RIGHT BREAST ON MAMMOGRAM: ICD-10-CM

## 2023-03-07 DIAGNOSIS — N64.89 BREAST ASYMMETRY: ICD-10-CM

## 2023-03-07 PROCEDURE — 76642 ULTRASOUND BREAST LIMITED: CPT

## 2023-03-07 PROCEDURE — G0279 TOMOSYNTHESIS, MAMMO: HCPCS

## 2023-09-06 ENCOUNTER — TELEPHONE (OUTPATIENT)
Dept: FAMILY MEDICINE CLINIC | Age: 48
End: 2023-09-06

## 2023-09-06 NOTE — TELEPHONE ENCOUNTER
----- Message from Helen Childs sent at 9/6/2023  9:40 AM EDT -----  Subject: Appointment Request    Reason for Call: New Patient/New to Provider Appointment needed: New   Patient Request Appointment    QUESTIONS    Reason for appointment request? No appointments available during search     Additional Information for Provider? The patient called requesting to   schedule a new patient appointment to establish care with PCP Jasmyn Tripathi.   ---------------------------------------------------------------------------  --------------  Doron Bush [facility]  [phone]; OK to leave message on voicemail  ---------------------------------------------------------------------------  --------------  SCRIPT ANSWERS

## 2023-09-16 SDOH — HEALTH STABILITY: PHYSICAL HEALTH: ON AVERAGE, HOW MANY DAYS PER WEEK DO YOU ENGAGE IN MODERATE TO STRENUOUS EXERCISE (LIKE A BRISK WALK)?: 2 DAYS

## 2023-09-16 SDOH — HEALTH STABILITY: PHYSICAL HEALTH: ON AVERAGE, HOW MANY MINUTES DO YOU ENGAGE IN EXERCISE AT THIS LEVEL?: 20 MIN

## 2023-09-19 ENCOUNTER — OFFICE VISIT (OUTPATIENT)
Dept: FAMILY MEDICINE CLINIC | Age: 48
End: 2023-09-19
Payer: MEDICARE

## 2023-09-19 VITALS
HEIGHT: 62 IN | HEART RATE: 62 BPM | TEMPERATURE: 98.2 F | DIASTOLIC BLOOD PRESSURE: 70 MMHG | OXYGEN SATURATION: 96 % | BODY MASS INDEX: 43.65 KG/M2 | SYSTOLIC BLOOD PRESSURE: 120 MMHG | WEIGHT: 237.2 LBS

## 2023-09-19 DIAGNOSIS — Z13.220 SCREENING, LIPID: ICD-10-CM

## 2023-09-19 DIAGNOSIS — Z59.41 FOOD INSECURITY: Primary | ICD-10-CM

## 2023-09-19 DIAGNOSIS — F20.9 SCHIZOPHRENIA, UNSPECIFIED TYPE (HCC): ICD-10-CM

## 2023-09-19 DIAGNOSIS — Z59.819 HOUSING INSECURITY: ICD-10-CM

## 2023-09-19 DIAGNOSIS — E53.9 VITAMIN B DEFICIENCY: ICD-10-CM

## 2023-09-19 DIAGNOSIS — R73.9 HYPERGLYCEMIA: ICD-10-CM

## 2023-09-19 DIAGNOSIS — E55.9 VITAMIN D DEFICIENCY: ICD-10-CM

## 2023-09-19 DIAGNOSIS — N92.6 IRREGULAR PERIODS: ICD-10-CM

## 2023-09-19 DIAGNOSIS — Z59.9 FINANCIAL DIFFICULTIES: ICD-10-CM

## 2023-09-19 DIAGNOSIS — Z13.29 SCREENING FOR THYROID DISORDER: ICD-10-CM

## 2023-09-19 DIAGNOSIS — Z12.4 PAP SMEAR FOR CERVICAL CANCER SCREENING: ICD-10-CM

## 2023-09-19 DIAGNOSIS — E66.01 CLASS 3 SEVERE OBESITY WITH BODY MASS INDEX (BMI) OF 40.0 TO 44.9 IN ADULT, UNSPECIFIED OBESITY TYPE, UNSPECIFIED WHETHER SERIOUS COMORBIDITY PRESENT (HCC): ICD-10-CM

## 2023-09-19 PROBLEM — E66.813 CLASS 3 SEVERE OBESITY IN ADULT: Status: ACTIVE | Noted: 2020-10-07

## 2023-09-19 PROCEDURE — 1036F TOBACCO NON-USER: CPT | Performed by: FAMILY MEDICINE

## 2023-09-19 PROCEDURE — G8417 CALC BMI ABV UP PARAM F/U: HCPCS | Performed by: FAMILY MEDICINE

## 2023-09-19 PROCEDURE — G8427 DOCREV CUR MEDS BY ELIG CLIN: HCPCS | Performed by: FAMILY MEDICINE

## 2023-09-19 PROCEDURE — 3078F DIAST BP <80 MM HG: CPT | Performed by: FAMILY MEDICINE

## 2023-09-19 PROCEDURE — 99204 OFFICE O/P NEW MOD 45 MIN: CPT | Performed by: FAMILY MEDICINE

## 2023-09-19 PROCEDURE — 3074F SYST BP LT 130 MM HG: CPT | Performed by: FAMILY MEDICINE

## 2023-09-19 SDOH — ECONOMIC STABILITY: HOUSING INSECURITY
IN THE LAST 12 MONTHS, WAS THERE A TIME WHEN YOU DID NOT HAVE A STEADY PLACE TO SLEEP OR SLEPT IN A SHELTER (INCLUDING NOW)?: NO

## 2023-09-19 SDOH — ECONOMIC STABILITY: INCOME INSECURITY: HOW HARD IS IT FOR YOU TO PAY FOR THE VERY BASICS LIKE FOOD, HOUSING, MEDICAL CARE, AND HEATING?: SOMEWHAT HARD

## 2023-09-19 SDOH — ECONOMIC STABILITY - INCOME SECURITY: PROBLEM RELATED TO HOUSING AND ECONOMIC CIRCUMSTANCES, UNSPECIFIED: Z59.9

## 2023-09-19 SDOH — ECONOMIC STABILITY: FOOD INSECURITY: WITHIN THE PAST 12 MONTHS, YOU WORRIED THAT YOUR FOOD WOULD RUN OUT BEFORE YOU GOT MONEY TO BUY MORE.: SOMETIMES TRUE

## 2023-09-19 SDOH — ECONOMIC STABILITY - HOUSING INSECURITY: HOUSING INSTABILITY UNSPECIFIED: Z59.819

## 2023-09-19 SDOH — ECONOMIC STABILITY - FOOD INSECURITY: FOOD INSECURITY: Z59.41

## 2023-09-19 SDOH — ECONOMIC STABILITY: FOOD INSECURITY: WITHIN THE PAST 12 MONTHS, THE FOOD YOU BOUGHT JUST DIDN'T LAST AND YOU DIDN'T HAVE MONEY TO GET MORE.: NEVER TRUE

## 2023-09-19 ASSESSMENT — ANXIETY QUESTIONNAIRES
IF YOU CHECKED OFF ANY PROBLEMS ON THIS QUESTIONNAIRE, HOW DIFFICULT HAVE THESE PROBLEMS MADE IT FOR YOU TO DO YOUR WORK, TAKE CARE OF THINGS AT HOME, OR GET ALONG WITH OTHER PEOPLE: NOT DIFFICULT AT ALL
1. FEELING NERVOUS, ANXIOUS, OR ON EDGE: 0
2. NOT BEING ABLE TO STOP OR CONTROL WORRYING: 0
GAD7 TOTAL SCORE: 0
GAD7 TOTAL SCORE: 0
1. FEELING NERVOUS, ANXIOUS, OR ON EDGE: 0
3. WORRYING TOO MUCH ABOUT DIFFERENT THINGS: 0
2. NOT BEING ABLE TO STOP OR CONTROL WORRYING: 0
7. FEELING AFRAID AS IF SOMETHING AWFUL MIGHT HAPPEN: 0
IF YOU CHECKED OFF ANY PROBLEMS ON THIS QUESTIONNAIRE, HOW DIFFICULT HAVE THESE PROBLEMS MADE IT FOR YOU TO DO YOUR WORK, TAKE CARE OF THINGS AT HOME, OR GET ALONG WITH OTHER PEOPLE: NOT DIFFICULT AT ALL
5. BEING SO RESTLESS THAT IT IS HARD TO SIT STILL: 0
4. TROUBLE RELAXING: 0
6. BECOMING EASILY ANNOYED OR IRRITABLE: 0
3. WORRYING TOO MUCH ABOUT DIFFERENT THINGS: 0
5. BEING SO RESTLESS THAT IT IS HARD TO SIT STILL: 0
6. BECOMING EASILY ANNOYED OR IRRITABLE: 0
4. TROUBLE RELAXING: 0
7. FEELING AFRAID AS IF SOMETHING AWFUL MIGHT HAPPEN: 0

## 2023-09-19 ASSESSMENT — ENCOUNTER SYMPTOMS
NAUSEA: 0
BLOOD IN STOOL: 0
STRIDOR: 0
DIARRHEA: 0
CONSTIPATION: 0
BACK PAIN: 0
EYE REDNESS: 0
SORE THROAT: 0
EYE DISCHARGE: 0
SHORTNESS OF BREATH: 0
COUGH: 0
EYE PAIN: 0
PHOTOPHOBIA: 0
ABDOMINAL PAIN: 0
VOMITING: 0

## 2023-09-19 ASSESSMENT — PATIENT HEALTH QUESTIONNAIRE - PHQ9
SUM OF ALL RESPONSES TO PHQ QUESTIONS 1-9: 0
SUM OF ALL RESPONSES TO PHQ9 QUESTIONS 1 & 2: 0
SUM OF ALL RESPONSES TO PHQ QUESTIONS 1-9: 0
SUM OF ALL RESPONSES TO PHQ9 QUESTIONS 1 & 2: 0
1. LITTLE INTEREST OR PLEASURE IN DOING THINGS: 0
2. FEELING DOWN, DEPRESSED OR HOPELESS: 0
1. LITTLE INTEREST OR PLEASURE IN DOING THINGS: 0
SUM OF ALL RESPONSES TO PHQ QUESTIONS 1-9: 0
1. LITTLE INTEREST OR PLEASURE IN DOING THINGS: 0
SUM OF ALL RESPONSES TO PHQ QUESTIONS 1-9: 0
SUM OF ALL RESPONSES TO PHQ QUESTIONS 1-9: 0
2. FEELING DOWN, DEPRESSED OR HOPELESS: 0
SUM OF ALL RESPONSES TO PHQ QUESTIONS 1-9: 0
2. FEELING DOWN, DEPRESSED OR HOPELESS: 0
SUM OF ALL RESPONSES TO PHQ QUESTIONS 1-9: 0
SUM OF ALL RESPONSES TO PHQ QUESTIONS 1-9: 0
SUM OF ALL RESPONSES TO PHQ9 QUESTIONS 1 & 2: 0

## 2023-09-19 NOTE — PROGRESS NOTES
Subjective:      Patient ID: Hussein Morley is a 51 y.o. female. State sold PCP moved away and need to set up appt with new PCP. Has concerns about BP. States normally BP is ok. States sees Dr Sruthi Olson at [facility] for Schizophrenia. Sx are under control. States lives by self, single, family lives closeby and visits often. No FH of CAD, Stroke, Cancers  No Gyn at  this time. LMP was in April - state shad stopped x 6 years or so before that . States meds coreg and HCTZ starte dby old doc- takes regularly. Mood controlled on zyprexa and Zoloft . No labs for a long time, also no Gyn appt after she thought she was menopausal. Will order Gyn eval as well as Labs , reevaluate in 2 weeks   Review of Systems   Constitutional:  Negative for chills and fever. HENT:  Negative for congestion, ear pain, hearing loss, nosebleeds, sore throat and tinnitus. Eyes:  Negative for photophobia, pain, discharge and redness. Respiratory:  Negative for cough, shortness of breath and stridor. Cardiovascular:  Negative for chest pain, palpitations and leg swelling. Gastrointestinal:  Negative for abdominal pain, blood in stool, constipation, diarrhea, nausea and vomiting. Endocrine: Negative for polydipsia. Genitourinary:  Negative for dysuria, flank pain, frequency, hematuria and urgency. Musculoskeletal:  Negative for back pain, myalgias and neck pain. Skin:  Negative for rash. Allergic/Immunologic: Negative for environmental allergies. Neurological:  Negative for dizziness, tremors, seizures, weakness and headaches. Hematological:  Does not bruise/bleed easily. Psychiatric/Behavioral:  Negative for hallucinations and suicidal ideas. The patient is not nervous/anxious. Objective:   Physical Exam  Constitutional:       General: She is not in acute distress. Appearance: She is well-developed. HENT:      Head: Normocephalic and atraumatic.       Right Ear: External ear normal.      Left Ear: External ear

## 2023-09-22 ENCOUNTER — CARE COORDINATION (OUTPATIENT)
Dept: CARE COORDINATION | Age: 48
End: 2023-09-22

## 2023-09-22 ENCOUNTER — HOSPITAL ENCOUNTER (OUTPATIENT)
Age: 48
Discharge: HOME | End: 2023-09-22
Payer: MEDICARE

## 2023-09-22 DIAGNOSIS — Z13.220 SCREENING, LIPID: ICD-10-CM

## 2023-09-22 DIAGNOSIS — Z13.29 SCREENING FOR THYROID DISORDER: ICD-10-CM

## 2023-09-22 DIAGNOSIS — E53.9 VITAMIN B DEFICIENCY: ICD-10-CM

## 2023-09-22 DIAGNOSIS — E66.01 CLASS 3 SEVERE OBESITY WITH BODY MASS INDEX (BMI) OF 40.0 TO 44.9 IN ADULT, UNSPECIFIED OBESITY TYPE, UNSPECIFIED WHETHER SERIOUS COMORBIDITY PRESENT (HCC): ICD-10-CM

## 2023-09-22 DIAGNOSIS — R73.9 HYPERGLYCEMIA: ICD-10-CM

## 2023-09-22 DIAGNOSIS — E55.9 VITAMIN D DEFICIENCY: ICD-10-CM

## 2023-09-22 LAB
25(OH)D3 SERPL-MCNC: 13 NG/ML
ALBUMIN SERPL-MCNC: 4 G/DL (ref 3.5–5.2)
ALBUMIN/GLOB SERPL: 1.3 {RATIO} (ref 1–2.5)
ALP SERPL-CCNC: 77 U/L (ref 35–104)
ALT SERPL-CCNC: 10 U/L (ref 5–33)
ANION GAP SERPL CALCULATED.3IONS-SCNC: 9 MMOL/L (ref 9–17)
AST SERPL-CCNC: 12 U/L
BILIRUB SERPL-MCNC: 0.7 MG/DL (ref 0.3–1.2)
BUN SERPL-MCNC: 6 MG/DL (ref 6–20)
CALCIUM SERPL-MCNC: 8.9 MG/DL (ref 8.6–10.4)
CHLORIDE SERPL-SCNC: 100 MMOL/L (ref 98–107)
CHOLEST SERPL-MCNC: 168 MG/DL
CHOLESTEROL/HDL RATIO: 3.1
CO2 SERPL-SCNC: 29 MMOL/L (ref 20–31)
CREAT SERPL-MCNC: 0.7 MG/DL (ref 0.5–0.9)
ERYTHROCYTE [DISTWIDTH] IN BLOOD BY AUTOMATED COUNT: 13.3 % (ref 11.8–14.4)
EST. AVERAGE GLUCOSE BLD GHB EST-MCNC: 94 MG/DL
FOLATE SERPL-MCNC: 4.5 NG/ML (ref 4.8–24.2)
GFR SERPL CREATININE-BSD FRML MDRD: >60 ML/MIN/1.73M2
GLUCOSE SERPL-MCNC: 103 MG/DL (ref 70–99)
HBA1C MFR BLD: 4.9 % (ref 4–6)
HCT VFR BLD AUTO: 47.9 % (ref 36.3–47.1)
HDLC SERPL-MCNC: 55 MG/DL
HGB BLD-MCNC: 16.1 G/DL (ref 11.9–15.1)
LDLC SERPL CALC-MCNC: 94 MG/DL (ref 0–130)
MCH RBC QN AUTO: 33.7 PG (ref 25.2–33.5)
MCHC RBC AUTO-ENTMCNC: 33.6 G/DL (ref 28.4–34.8)
MCV RBC AUTO: 100.2 FL (ref 82.6–102.9)
NRBC BLD-RTO: 0 PER 100 WBC
PLATELET # BLD AUTO: 262 K/UL (ref 138–453)
PMV BLD AUTO: 9.5 FL (ref 8.1–13.5)
POTASSIUM SERPL-SCNC: 3.8 MMOL/L (ref 3.7–5.3)
PROT SERPL-MCNC: 7.1 G/DL (ref 6.4–8.3)
RBC # BLD AUTO: 4.78 M/UL (ref 3.95–5.11)
SODIUM SERPL-SCNC: 138 MMOL/L (ref 135–144)
T4 FREE SERPL-MCNC: 1 NG/DL (ref 0.9–1.7)
TRIGL SERPL-MCNC: 97 MG/DL
TSH SERPL DL<=0.05 MIU/L-ACNC: 0.92 UIU/ML (ref 0.3–5)
VIT B12 SERPL-MCNC: 321 PG/ML (ref 232–1245)
WBC OTHER # BLD: 4.3 K/UL (ref 3.5–11.3)

## 2023-09-22 PROCEDURE — 83036 HEMOGLOBIN GLYCOSYLATED A1C: CPT

## 2023-09-22 PROCEDURE — 82306 VITAMIN D 25 HYDROXY: CPT

## 2023-09-22 PROCEDURE — 84443 ASSAY THYROID STIM HORMONE: CPT

## 2023-09-22 PROCEDURE — 36415 COLL VENOUS BLD VENIPUNCTURE: CPT

## 2023-09-22 PROCEDURE — 83527 ASSAY OF INSULIN: CPT

## 2023-09-22 PROCEDURE — 82746 ASSAY OF FOLIC ACID SERUM: CPT

## 2023-09-22 PROCEDURE — 83525 ASSAY OF INSULIN: CPT

## 2023-09-22 PROCEDURE — 84439 ASSAY OF FREE THYROXINE: CPT

## 2023-09-22 PROCEDURE — 80053 COMPREHEN METABOLIC PANEL: CPT

## 2023-09-22 PROCEDURE — 82607 VITAMIN B-12: CPT

## 2023-09-22 PROCEDURE — 80061 LIPID PANEL: CPT

## 2023-09-22 PROCEDURE — 85027 COMPLETE CBC AUTOMATED: CPT

## 2023-09-22 NOTE — CARE COORDINATION
Patient was referred to  by Nano Lloyd, who stated  that this patient has SDOH. HC phoned and spoke with the patient and dis-  cussed her need for stable housing and needs more food. [facility]. provided patient  with information of two upcoming opportunities for waiting list opening up. She   stated that she appreciated the information. HC also completed a referral for   patient for [facility]. She stated that she will be looking out for the information.       Plan of Care  [facility]. will follow up with patient on her food resources from [facility]

## 2023-09-24 LAB
INSULIN FREE: 6 UIU/ML (ref 3–25)
INSULIN: 8 UIU/ML (ref 3–25)

## 2023-10-31 ENCOUNTER — TELEPHONE (OUTPATIENT)
Dept: GASTROENTEROLOGY | Age: 48
End: 2023-10-31

## 2023-10-31 ENCOUNTER — OFFICE VISIT (OUTPATIENT)
Dept: FAMILY MEDICINE CLINIC | Age: 48
End: 2023-10-31
Payer: MEDICARE

## 2023-10-31 VITALS
HEART RATE: 68 BPM | DIASTOLIC BLOOD PRESSURE: 85 MMHG | OXYGEN SATURATION: 99 % | SYSTOLIC BLOOD PRESSURE: 126 MMHG | TEMPERATURE: 97.4 F | WEIGHT: 242.2 LBS | BODY MASS INDEX: 44.57 KG/M2 | HEIGHT: 62 IN

## 2023-10-31 DIAGNOSIS — Z12.11 SCREEN FOR COLON CANCER: ICD-10-CM

## 2023-10-31 DIAGNOSIS — E53.8 FOLATE DEFICIENCY: ICD-10-CM

## 2023-10-31 DIAGNOSIS — E66.01 CLASS 3 SEVERE OBESITY WITH BODY MASS INDEX (BMI) OF 40.0 TO 44.9 IN ADULT, UNSPECIFIED OBESITY TYPE, UNSPECIFIED WHETHER SERIOUS COMORBIDITY PRESENT (HCC): ICD-10-CM

## 2023-10-31 DIAGNOSIS — D58.2 ELEVATED HEMOGLOBIN (HCC): ICD-10-CM

## 2023-10-31 DIAGNOSIS — Z71.6 TOBACCO ABUSE COUNSELING: ICD-10-CM

## 2023-10-31 DIAGNOSIS — I10 ESSENTIAL HYPERTENSION: Primary | ICD-10-CM

## 2023-10-31 DIAGNOSIS — Z72.0 TOBACCO ABUSE DISORDER: ICD-10-CM

## 2023-10-31 DIAGNOSIS — F20.9 SCHIZOPHRENIA, UNSPECIFIED TYPE (HCC): ICD-10-CM

## 2023-10-31 DIAGNOSIS — E53.8 B12 DEFICIENCY: ICD-10-CM

## 2023-10-31 DIAGNOSIS — E55.9 VITAMIN D DEFICIENCY: ICD-10-CM

## 2023-10-31 PROCEDURE — 99214 OFFICE O/P EST MOD 30 MIN: CPT | Performed by: FAMILY MEDICINE

## 2023-10-31 PROCEDURE — 3079F DIAST BP 80-89 MM HG: CPT | Performed by: FAMILY MEDICINE

## 2023-10-31 PROCEDURE — G8484 FLU IMMUNIZE NO ADMIN: HCPCS | Performed by: FAMILY MEDICINE

## 2023-10-31 PROCEDURE — 3074F SYST BP LT 130 MM HG: CPT | Performed by: FAMILY MEDICINE

## 2023-10-31 PROCEDURE — G8417 CALC BMI ABV UP PARAM F/U: HCPCS | Performed by: FAMILY MEDICINE

## 2023-10-31 PROCEDURE — G8427 DOCREV CUR MEDS BY ELIG CLIN: HCPCS | Performed by: FAMILY MEDICINE

## 2023-10-31 PROCEDURE — 1036F TOBACCO NON-USER: CPT | Performed by: FAMILY MEDICINE

## 2023-10-31 RX ORDER — ERGOCALCIFEROL 1.25 MG/1
50000 CAPSULE ORAL WEEKLY
Qty: 12 CAPSULE | Refills: 2 | Status: SHIPPED | OUTPATIENT
Start: 2023-10-31

## 2023-10-31 RX ORDER — FOLIC ACID 1 MG/1
1 TABLET ORAL DAILY
Qty: 90 TABLET | Refills: 5 | Status: SHIPPED | OUTPATIENT
Start: 2023-10-31

## 2023-10-31 ASSESSMENT — ENCOUNTER SYMPTOMS
EYE REDNESS: 0
CONSTIPATION: 0
DIARRHEA: 0
BLOOD IN STOOL: 0
PHOTOPHOBIA: 0
COUGH: 0
ABDOMINAL PAIN: 0
SORE THROAT: 0
STRIDOR: 0
EYE PAIN: 0
VOMITING: 0
NAUSEA: 0
BACK PAIN: 0
SHORTNESS OF BREATH: 0
EYE DISCHARGE: 0

## 2023-10-31 ASSESSMENT — PATIENT HEALTH QUESTIONNAIRE - PHQ9
1. LITTLE INTEREST OR PLEASURE IN DOING THINGS: 0
SUM OF ALL RESPONSES TO PHQ QUESTIONS 1-9: 0
SUM OF ALL RESPONSES TO PHQ9 QUESTIONS 1 & 2: 0
SUM OF ALL RESPONSES TO PHQ QUESTIONS 1-9: 0
2. FEELING DOWN, DEPRESSED OR HOPELESS: 0
SUM OF ALL RESPONSES TO PHQ QUESTIONS 1-9: 0
2. FEELING DOWN, DEPRESSED OR HOPELESS: 0
SUM OF ALL RESPONSES TO PHQ9 QUESTIONS 1 & 2: 0
SUM OF ALL RESPONSES TO PHQ QUESTIONS 1-9: 0
1. LITTLE INTEREST OR PLEASURE IN DOING THINGS: 0

## 2023-10-31 ASSESSMENT — ANXIETY QUESTIONNAIRES
3. WORRYING TOO MUCH ABOUT DIFFERENT THINGS: 0
1. FEELING NERVOUS, ANXIOUS, OR ON EDGE: 0
GAD7 TOTAL SCORE: 0
IF YOU CHECKED OFF ANY PROBLEMS ON THIS QUESTIONNAIRE, HOW DIFFICULT HAVE THESE PROBLEMS MADE IT FOR YOU TO DO YOUR WORK, TAKE CARE OF THINGS AT HOME, OR GET ALONG WITH OTHER PEOPLE: NOT DIFFICULT AT ALL
6. BECOMING EASILY ANNOYED OR IRRITABLE: 0
4. TROUBLE RELAXING: 0
7. FEELING AFRAID AS IF SOMETHING AWFUL MIGHT HAPPEN: 0
5. BEING SO RESTLESS THAT IT IS HARD TO SIT STILL: 0
2. NOT BEING ABLE TO STOP OR CONTROL WORRYING: 0

## 2023-10-31 NOTE — TELEPHONE ENCOUNTER
Patient is scheduled for OV with Goldy for Mariano Burgos @1:15pm. Patient would like to schedule at [facility]'s if she is comfortable after seeing Dr. Leilani Maloney for [facility] for colonoscopy.

## 2023-10-31 NOTE — PROGRESS NOTES
Subjective:      Patient ID: Marguerite Wolfe is a 51 y.o. female. States mood, sleep and anxiety ok. States does not do much exercise. Tries to eat better. Smokes 2-3  Cigars a day- no plans on quitting. States bowels and bladder ok. States labs completed- will discuss in detail. Will supplement with high dose vit D, folate daily as well as recommend a MVT daily. Review of Systems   Constitutional:  Negative for chills and fever. HENT:  Negative for congestion, ear pain, hearing loss, nosebleeds, sore throat and tinnitus. Eyes:  Negative for photophobia, pain, discharge and redness. Respiratory:  Negative for cough, shortness of breath and stridor. Cardiovascular:  Negative for chest pain, palpitations and leg swelling. Gastrointestinal:  Negative for abdominal pain, blood in stool, constipation, diarrhea, nausea and vomiting. Endocrine: Negative for polydipsia. Genitourinary:  Negative for dysuria, flank pain, frequency, hematuria and urgency. Musculoskeletal:  Negative for back pain, myalgias and neck pain. Skin:  Negative for rash. Allergic/Immunologic: Negative for environmental allergies. Neurological:  Negative for dizziness, tremors, seizures, weakness and headaches. Hematological:  Does not bruise/bleed easily. Psychiatric/Behavioral:  Negative for hallucinations and suicidal ideas. The patient is not nervous/anxious. Objective:   Physical Exam  Constitutional:       General: She is not in acute distress. Appearance: She is well-developed. HENT:      Head: Normocephalic and atraumatic. Right Ear: External ear normal.      Left Ear: External ear normal.      Nose: Nose normal.      Mouth/Throat:      Mouth: Mucous membranes are moist.   Eyes:      Conjunctiva/sclera: Conjunctivae normal.      Pupils: Pupils are equal, round, and reactive to light. Cardiovascular:      Rate and Rhythm: Normal rate and regular rhythm. Heart sounds: Normal heart sounds.

## 2023-11-28 ENCOUNTER — OFFICE VISIT (OUTPATIENT)
Dept: OBGYN CLINIC | Age: 48
End: 2023-11-28
Payer: MEDICARE

## 2023-11-28 ENCOUNTER — HOSPITAL ENCOUNTER (OUTPATIENT)
Age: 48
Setting detail: SPECIMEN
Discharge: HOME | End: 2023-11-28

## 2023-11-28 VITALS
BODY MASS INDEX: 44.7 KG/M2 | HEART RATE: 72 BPM | DIASTOLIC BLOOD PRESSURE: 90 MMHG | WEIGHT: 244.4 LBS | SYSTOLIC BLOOD PRESSURE: 144 MMHG

## 2023-11-28 DIAGNOSIS — N93.9 ABNORMAL UTERINE BLEEDING (AUB): ICD-10-CM

## 2023-11-28 DIAGNOSIS — N93.9 ABNORMAL UTERINE BLEEDING (AUB): Primary | ICD-10-CM

## 2023-11-28 LAB
B-HCG SERPL EIA 3RD IS-ACNC: <0.1 MIU/ML (ref 0–7)
BASOPHILS # BLD: <0.03 K/UL (ref 0–0.2)
BASOPHILS NFR BLD: 0 % (ref 0–2)
EOSINOPHIL # BLD: 0.16 K/UL (ref 0–0.44)
EOSINOPHILS RELATIVE PERCENT: 3 % (ref 1–4)
ERYTHROCYTE [DISTWIDTH] IN BLOOD BY AUTOMATED COUNT: 13.6 % (ref 11.8–14.4)
ESTRADIOL LEVEL: 92 PG/ML
FSH SERPL-ACNC: 5.6 MIU/ML
HCT VFR BLD AUTO: 45.4 % (ref 36.3–47.1)
HGB BLD-MCNC: 15.3 G/DL (ref 11.9–15.1)
IMM GRANULOCYTES # BLD AUTO: 0.04 K/UL (ref 0–0.3)
IMM GRANULOCYTES NFR BLD: 1 %
LYMPHOCYTES NFR BLD: 1.83 K/UL (ref 1.1–3.7)
LYMPHOCYTES RELATIVE PERCENT: 38 % (ref 24–43)
MCH RBC QN AUTO: 33.5 PG (ref 25.2–33.5)
MCHC RBC AUTO-ENTMCNC: 33.7 G/DL (ref 28.4–34.8)
MCV RBC AUTO: 99.3 FL (ref 82.6–102.9)
MONOCYTES NFR BLD: 0.39 K/UL (ref 0.1–1.2)
MONOCYTES NFR BLD: 8 % (ref 3–12)
NEUTROPHILS NFR BLD: 50 % (ref 36–65)
NEUTS SEG NFR BLD: 2.44 K/UL (ref 1.5–8.1)
NRBC BLD-RTO: 0 PER 100 WBC
PLATELET # BLD AUTO: 286 K/UL (ref 138–453)
PMV BLD AUTO: 10 FL (ref 8.1–13.5)
RBC # BLD AUTO: 4.57 M/UL (ref 3.95–5.11)
TSH SERPL DL<=0.05 MIU/L-ACNC: 0.81 UIU/ML (ref 0.27–4.2)
WBC OTHER # BLD: 4.9 K/UL (ref 3.5–11.3)

## 2023-11-28 PROCEDURE — G8427 DOCREV CUR MEDS BY ELIG CLIN: HCPCS | Performed by: STUDENT IN AN ORGANIZED HEALTH CARE EDUCATION/TRAINING PROGRAM

## 2023-11-28 PROCEDURE — 3080F DIAST BP >= 90 MM HG: CPT | Performed by: STUDENT IN AN ORGANIZED HEALTH CARE EDUCATION/TRAINING PROGRAM

## 2023-11-28 PROCEDURE — 1036F TOBACCO NON-USER: CPT | Performed by: STUDENT IN AN ORGANIZED HEALTH CARE EDUCATION/TRAINING PROGRAM

## 2023-11-28 PROCEDURE — G8484 FLU IMMUNIZE NO ADMIN: HCPCS | Performed by: STUDENT IN AN ORGANIZED HEALTH CARE EDUCATION/TRAINING PROGRAM

## 2023-11-28 PROCEDURE — 3077F SYST BP >= 140 MM HG: CPT | Performed by: STUDENT IN AN ORGANIZED HEALTH CARE EDUCATION/TRAINING PROGRAM

## 2023-11-28 PROCEDURE — G8417 CALC BMI ABV UP PARAM F/U: HCPCS | Performed by: STUDENT IN AN ORGANIZED HEALTH CARE EDUCATION/TRAINING PROGRAM

## 2023-11-28 PROCEDURE — 99203 OFFICE O/P NEW LOW 30 MIN: CPT | Performed by: STUDENT IN AN ORGANIZED HEALTH CARE EDUCATION/TRAINING PROGRAM

## 2023-11-28 NOTE — PROGRESS NOTES
for all patients 7-36 yo. Hereditary Breast, Ovarian, Colon and Uterine Cancer screening discussed. Tobacco & Secondary smoke risks discussed; with recommendation for cessation and avoidance. Routine health maintenance per patients PCP discussed. The patient, Paul Brown is a 51 y.o. female, was seen with a total time spent of 30 minutes for the visit on this date of service by the E/M provider. The time component had both face to face and non face to face time spent in determining the total time component. Counseling and education regarding her diagnosis listed below and her options regarding those diagnoses were also included in determining her time component. Diagnosis Orders   1. Abnormal uterine bleeding (AUB)  CBC with Auto Differential    TSH with Reflex    HCG, Quantitative, Pregnancy    Follicle Stimulating Hormone    Estradiol    US PELVIS COMPLETE NON-OB TRANSABDOMINAL AND TRANSVAGINAL           The patient had her preventative health maintenance recommendations and follow-up reviewed with her at the completion of her visit.         DO Corrine Hayden Ob/Gyn   11/28/2023, 10:49 AM

## 2023-12-28 ENCOUNTER — PROCEDURE VISIT (OUTPATIENT)
Dept: OBGYN CLINIC | Age: 48
End: 2023-12-28

## 2023-12-28 ENCOUNTER — HOSPITAL ENCOUNTER (OUTPATIENT)
Age: 48
Setting detail: SPECIMEN
Discharge: HOME | End: 2023-12-28

## 2023-12-28 VITALS
HEART RATE: 66 BPM | DIASTOLIC BLOOD PRESSURE: 87 MMHG | BODY MASS INDEX: 50.2 KG/M2 | HEIGHT: 59 IN | SYSTOLIC BLOOD PRESSURE: 128 MMHG | WEIGHT: 249 LBS

## 2023-12-28 DIAGNOSIS — N93.9 ABNORMAL UTERINE BLEEDING (AUB): Primary | ICD-10-CM

## 2023-12-28 DIAGNOSIS — Z12.4 PAP SMEAR FOR CERVICAL CANCER SCREENING: ICD-10-CM

## 2023-12-28 NOTE — PROGRESS NOTES
[address] [facility]  [address] [facility], [address] [facility],[address]      Procedure Note    Negrita Morel  12/28/2023                       Primary Care Physician: Chaz Tejada MD      Subjective:   Negrita Morel 51 y.o. rhonda Agudelo is here for previously scheduled EMB/pap due to history of AUB. Patient's last menstrual period was 06/13/2023. Mickey Craw She has no complaints today. Vitals:   Vitals:    12/28/23 0907   BP: 128/87   Site: Right Upper Arm   Position: Sitting   Cuff Size: Large Adult   Pulse: 66   Weight: 112.9 kg (249 lb)   Height: 1.499 m (4' 11\")         Procedure: EMB and pap    Indications: AUB    Procedure Details: The patient was counseled on the procedure. Risks, benefits and alternatives were reviewed. The patient is aware that this is diagnostic and not curative and a second procedure may be needed. A consent was not reviewed and obtained. Endometrial Biopsy  A sterile speculum was placed into the vagina and the cervix was identified. A pap smear was collected in the usual fashion. The cervix was cleansed with Betadine. It was not stabilized with an allis clamp and the aspirator was then gently passed into the endometrial cavity. Tissue was obtained and sent to pathology. The patient tolerated the procedure well. Post procedure restrictions were reviewed and given to the patient. All counts and instruments were correct at the end of the procedure.      Lab:  Pregnancy Test:  Lab Results   Component Value Date    HCGQUANT <0.1 11/28/2023       Assessment & Plan:  Negrita Maria Teresa 51 y.o. female Juaquin Agudelo  Patient Active Problem List    Diagnosis Date Noted    Tobacco abuse disorder 10/31/2023     2 cigars and as needed may be one more      Tobacco abuse counseling 10/31/2023    Folate deficiency 10/31/2023    Vitamin D deficiency 10/31/2023    B12 deficiency 10/31/2023    Elevated hemoglobin ([address]) 10/31/2023    Essential hypertension 03/05/2021    Class 3

## 2023-12-30 LAB
HPV I/H RISK 4 DNA CVX QL NAA+PROBE: NOT DETECTED
HPV SAMPLE: NORMAL
HPV, INTERPRETATION: NORMAL
HPV16 DNA CVX QL NAA+PROBE: NOT DETECTED
HPV18 DNA CVX QL NAA+PROBE: NOT DETECTED
SPECIMEN DESCRIPTION: NORMAL

## 2024-01-02 LAB — SURGICAL PATHOLOGY REPORT: NORMAL

## 2024-01-08 LAB — CYTOLOGY REPORT: NORMAL

## 2024-01-23 NOTE — PROGRESS NOTES
[facility], [facility]  [address]  [address]  [place] [address]  Dept: [phone]  Dept Fax: [phone]         2024  Avani [place] Luis       Chief Complaint  Chief Complaint   Patient presents with    Follow-up     Seen Dr Collazo 23 AUB - had US 23 EMB 23     .    Blood pressure (!) 128/91, height 1.499 m (4' 11\"), weight 110.7 kg (244 lb), last menstrual period 2023.    HPI:     Avani Smith  1975 is a 48 y.o.  here today for follow up.  She was seeing Dr. Collazo and had and EMB done on 23.  She was originally seen as a new patient on 23 and stated that she is postmenopausal and has gone 1 full year with no cycles. However, over the past 6 to 7 years, she has been having irregular bleeding every few months. The flow could be heavy and last 2+ weeks. She is currently not sexually active. She prior to age 39, her cycles can monthly and were heavy in nature but not painful. She denies any current use of hormonal contraception for bleeding management.  EMB showed weakly proliferative endometrial negative for atypia.  Pap was NILM hpv negative.  FSH was 5.6 and estradiol was 92.  Her TSH was 0.81.  She has also had an ultrasound on 23 which showed Uterus measures 6.9 x 4.8 x 2.9 cm. Endometrium measures 0.73 cm. Right ovary unremarkable. Left ovary with simple cyst measruing 2.5 x 2.3 x 2.6 cm. No need for follow up due to small size of cyst.         OB History    Para Term  AB Living   0 0 0 0 0 0   SAB IAB Ectopic Molar Multiple Live Births   0 0 0 0 0 0       Past Medical History:   Diagnosis Date    Anxiety     Depression     Hypertension        History reviewed. No pertinent surgical history.    Family History   Problem Relation Age of Onset    Arthritis Mother     Asthma Mother     Gout Mother     High Blood Pressure Mother     Arthritis Father     Cancer Father

## 2024-01-25 ENCOUNTER — OFFICE VISIT (OUTPATIENT)
Dept: OBGYN CLINIC | Age: 49
End: 2024-01-25
Payer: MEDICARE

## 2024-01-25 VITALS
WEIGHT: 244 LBS | DIASTOLIC BLOOD PRESSURE: 91 MMHG | SYSTOLIC BLOOD PRESSURE: 128 MMHG | HEIGHT: 59 IN | BODY MASS INDEX: 49.19 KG/M2

## 2024-01-25 DIAGNOSIS — N93.8 DYSFUNCTIONAL UTERINE BLEEDING: Primary | ICD-10-CM

## 2024-01-25 PROCEDURE — 3074F SYST BP LT 130 MM HG: CPT | Performed by: NURSE PRACTITIONER

## 2024-01-25 PROCEDURE — 99213 OFFICE O/P EST LOW 20 MIN: CPT | Performed by: NURSE PRACTITIONER

## 2024-01-25 PROCEDURE — 3080F DIAST BP >= 90 MM HG: CPT | Performed by: NURSE PRACTITIONER

## 2024-01-25 PROCEDURE — G8484 FLU IMMUNIZE NO ADMIN: HCPCS | Performed by: NURSE PRACTITIONER

## 2024-01-25 PROCEDURE — G8427 DOCREV CUR MEDS BY ELIG CLIN: HCPCS | Performed by: NURSE PRACTITIONER

## 2024-01-25 PROCEDURE — 1036F TOBACCO NON-USER: CPT | Performed by: NURSE PRACTITIONER

## 2024-01-25 PROCEDURE — G8417 CALC BMI ABV UP PARAM F/U: HCPCS | Performed by: NURSE PRACTITIONER

## 2024-01-25 ASSESSMENT — ENCOUNTER SYMPTOMS
RESPIRATORY NEGATIVE: 1
GASTROINTESTINAL NEGATIVE: 1

## 2024-02-28 SDOH — HEALTH STABILITY: PHYSICAL HEALTH: ON AVERAGE, HOW MANY DAYS PER WEEK DO YOU ENGAGE IN MODERATE TO STRENUOUS EXERCISE (LIKE A BRISK WALK)?: 1 DAY

## 2024-02-28 SDOH — HEALTH STABILITY: PHYSICAL HEALTH: ON AVERAGE, HOW MANY MINUTES DO YOU ENGAGE IN EXERCISE AT THIS LEVEL?: 20 MIN

## 2024-02-29 ENCOUNTER — OFFICE VISIT (OUTPATIENT)
Dept: FAMILY MEDICINE CLINIC | Age: 49
End: 2024-02-29

## 2024-02-29 VITALS
SYSTOLIC BLOOD PRESSURE: 157 MMHG | TEMPERATURE: 97.2 F | BODY MASS INDEX: 49.8 KG/M2 | HEART RATE: 75 BPM | DIASTOLIC BLOOD PRESSURE: 92 MMHG | OXYGEN SATURATION: 99 % | WEIGHT: 247 LBS | HEIGHT: 59 IN

## 2024-02-29 DIAGNOSIS — I10 ESSENTIAL HYPERTENSION: Primary | ICD-10-CM

## 2024-02-29 DIAGNOSIS — E78.5 DYSLIPIDEMIA: ICD-10-CM

## 2024-02-29 DIAGNOSIS — D58.2 ELEVATED HEMOGLOBIN (HCC): ICD-10-CM

## 2024-02-29 DIAGNOSIS — R73.9 HYPERGLYCEMIA: ICD-10-CM

## 2024-02-29 DIAGNOSIS — F20.9 SCHIZOPHRENIA, UNSPECIFIED TYPE (HCC): ICD-10-CM

## 2024-02-29 DIAGNOSIS — E55.9 VITAMIN D DEFICIENCY: ICD-10-CM

## 2024-02-29 RX ORDER — AMLODIPINE BESYLATE 10 MG/1
5 TABLET ORAL DAILY
Qty: 30 TABLET | Refills: 3 | Status: SHIPPED | OUTPATIENT
Start: 2024-02-29

## 2024-02-29 ASSESSMENT — PATIENT HEALTH QUESTIONNAIRE - PHQ9
SUM OF ALL RESPONSES TO PHQ QUESTIONS 1-9: 0
SUM OF ALL RESPONSES TO PHQ9 QUESTIONS 1 & 2: 0
SUM OF ALL RESPONSES TO PHQ QUESTIONS 1-9: 0
2. FEELING DOWN, DEPRESSED OR HOPELESS: 0
1. LITTLE INTEREST OR PLEASURE IN DOING THINGS: 0

## 2024-02-29 NOTE — PROGRESS NOTES
Smokeless tobacco: Never   Substance Use Topics    Alcohol use: No      Body mass index is 49.86 kg/m².  BP (!) 157/92   Pulse 75   Temp 97.2 °F (36.2 °C)   Ht 1.499 m (4' 11.02\")   Wt 112 kg (247 lb)   LMP 02/28/2024   SpO2 99%   BMI 49.86 kg/m²     Subjective:      HPI    48 y.o. female patient is coming in today for follow-up/establishing care.  She did just see recently her primary care physician.  The patient has a history of elevated blood pressure.  Blood pressure elevated today.  Also her weight is above normal.  Patient states that she started to cook.  And she cooks with a lot of salt appears.    Patient also has a history of schizophrenia. Dr Rodo Mejia - IRVIN 3 months.  On disability.    Single. No kids.  Mom 73 yo - healthy.  Dad 72 yo - h/o prostate cancer.  1 sister - well.    Tobaccos abuse: 8 cig a day.  No other illicit drug use.    Review of Systems   Constitutional: Negative for fever and unexpected weight change.   Pertinent items are noted in HPI.    Objective:   Physical Exam  Constitutional: VS (see above).   General appearance: normal development, habitus and attention, no deformities. No distress.  Eyes: normal conjunctiva and lids.  CAV: RRR, no RMG. No edema lower extremities.  Pulmo: CTA bilateral, no CWR.  Skin: no rashes, lesions or ulcers.  Musculoskeletal: normal gait. Nails: no clubbing or cyanosis.  Psychiatric: alert and oriented to place, time and person. Normal mood and affect.    Assessment:       Diagnosis Orders   1. Essential hypertension  CBC with Auto Differential    Comprehensive Metabolic Panel    TSH with Reflex    Lipid Panel    Hemoglobin A1C      2. Schizophrenia, unspecified type (HCC)        3. Elevated hemoglobin (HCC)        4. Hyperglycemia  CBC with Auto Differential    Comprehensive Metabolic Panel    TSH with Reflex    Lipid Panel    Hemoglobin A1C      5. Dyslipidemia  CBC with Auto Differential    Comprehensive Metabolic Panel    TSH with Reflex    Lipid

## 2024-03-07 ENCOUNTER — HOSPITAL ENCOUNTER (OUTPATIENT)
Age: 49
Setting detail: SPECIMEN
Discharge: HOME | End: 2024-03-07

## 2024-03-07 ENCOUNTER — HOSPITAL ENCOUNTER (OUTPATIENT)
Dept: MAMMOGRAPHY | Age: 49
Discharge: HOME | End: 2024-03-09
Payer: MEDICARE

## 2024-03-07 VITALS — BODY MASS INDEX: 49.8 KG/M2 | WEIGHT: 247 LBS | HEIGHT: 59 IN

## 2024-03-07 DIAGNOSIS — Z12.31 VISIT FOR SCREENING MAMMOGRAM: ICD-10-CM

## 2024-03-07 DIAGNOSIS — I10 ESSENTIAL HYPERTENSION: ICD-10-CM

## 2024-03-07 DIAGNOSIS — E78.5 DYSLIPIDEMIA: ICD-10-CM

## 2024-03-07 DIAGNOSIS — E55.9 VITAMIN D DEFICIENCY: ICD-10-CM

## 2024-03-07 DIAGNOSIS — R73.9 HYPERGLYCEMIA: ICD-10-CM

## 2024-03-07 LAB
25(OH)D3 SERPL-MCNC: 41 NG/ML (ref 30–100)
ALBUMIN SERPL-MCNC: 4.2 G/DL (ref 3.5–5.2)
ALBUMIN/GLOB SERPL: 1 {RATIO} (ref 1–2.5)
ALP SERPL-CCNC: 86 U/L (ref 35–104)
ALT SERPL-CCNC: 21 U/L (ref 10–35)
ANION GAP SERPL CALCULATED.3IONS-SCNC: 10 MMOL/L (ref 9–16)
AST SERPL-CCNC: 22 U/L (ref 10–35)
BASOPHILS # BLD: <0.03 K/UL (ref 0–0.2)
BASOPHILS NFR BLD: 1 % (ref 0–2)
BILIRUB SERPL-MCNC: 0.4 MG/DL (ref 0–1.2)
BUN SERPL-MCNC: 7 MG/DL (ref 6–20)
CALCIUM SERPL-MCNC: 9.2 MG/DL (ref 8.6–10.4)
CHLORIDE SERPL-SCNC: 102 MMOL/L (ref 98–107)
CHOLEST SERPL-MCNC: 160 MG/DL (ref 0–199)
CHOLESTEROL/HDL RATIO: 2
CO2 SERPL-SCNC: 27 MMOL/L (ref 20–31)
CREAT SERPL-MCNC: 0.8 MG/DL (ref 0.5–0.9)
EOSINOPHIL # BLD: 0.14 K/UL (ref 0–0.44)
EOSINOPHILS RELATIVE PERCENT: 3 % (ref 1–4)
ERYTHROCYTE [DISTWIDTH] IN BLOOD BY AUTOMATED COUNT: 13.6 % (ref 11.8–14.4)
EST. AVERAGE GLUCOSE BLD GHB EST-MCNC: 103 MG/DL
GFR SERPL CREATININE-BSD FRML MDRD: >60 ML/MIN/1.73M2
GLUCOSE SERPL-MCNC: 94 MG/DL (ref 74–99)
HBA1C MFR BLD: 5.2 % (ref 4–6)
HCT VFR BLD AUTO: 45.8 % (ref 36.3–47.1)
HDLC SERPL-MCNC: 66 MG/DL
HGB BLD-MCNC: 15.8 G/DL (ref 11.9–15.1)
IMM GRANULOCYTES # BLD AUTO: <0.03 K/UL (ref 0–0.3)
IMM GRANULOCYTES NFR BLD: 0 %
LDLC SERPL CALC-MCNC: 77 MG/DL (ref 0–100)
LYMPHOCYTES NFR BLD: 1.33 K/UL (ref 1.1–3.7)
LYMPHOCYTES RELATIVE PERCENT: 30 % (ref 24–43)
MCH RBC QN AUTO: 34.3 PG (ref 25.2–33.5)
MCHC RBC AUTO-ENTMCNC: 34.5 G/DL (ref 28.4–34.8)
MCV RBC AUTO: 99.6 FL (ref 82.6–102.9)
MONOCYTES NFR BLD: 0.36 K/UL (ref 0.1–1.2)
MONOCYTES NFR BLD: 8 % (ref 3–12)
NEUTROPHILS NFR BLD: 58 % (ref 36–65)
NEUTS SEG NFR BLD: 2.51 K/UL (ref 1.5–8.1)
NRBC BLD-RTO: 0 PER 100 WBC
PLATELET # BLD AUTO: 277 K/UL (ref 138–453)
PMV BLD AUTO: 9.9 FL (ref 8.1–13.5)
POTASSIUM SERPL-SCNC: 4.5 MMOL/L (ref 3.7–5.3)
PROT SERPL-MCNC: 7.3 G/DL (ref 6.6–8.7)
RBC # BLD AUTO: 4.6 M/UL (ref 3.95–5.11)
SODIUM SERPL-SCNC: 139 MMOL/L (ref 136–145)
TSH SERPL DL<=0.05 MIU/L-ACNC: 0.42 UIU/ML (ref 0.27–4.2)
VLDLC SERPL CALC-MCNC: 17 MG/DL
WBC OTHER # BLD: 4.4 K/UL (ref 3.5–11.3)

## 2024-03-07 PROCEDURE — 77063 BREAST TOMOSYNTHESIS BI: CPT

## 2024-03-13 ENCOUNTER — TELEPHONE (OUTPATIENT)
Dept: FAMILY MEDICINE CLINIC | Age: 49
End: 2024-03-13

## 2024-03-13 NOTE — TELEPHONE ENCOUNTER
Patient called in today to report her blood pressure readings      3/2/24 after medication  /81 PULSE 96    3/5/24 after medication  /89  PULSE 87    3/8/24 after medication   /87  PULSE 86    Today without medication   /94  PULSE 87      Please advise

## 2024-04-01 ENCOUNTER — OFFICE VISIT (OUTPATIENT)
Dept: FAMILY MEDICINE CLINIC | Age: 49
End: 2024-04-01
Payer: MEDICARE

## 2024-04-01 VITALS
BODY MASS INDEX: 50 KG/M2 | TEMPERATURE: 97.2 F | HEIGHT: 59 IN | DIASTOLIC BLOOD PRESSURE: 78 MMHG | SYSTOLIC BLOOD PRESSURE: 117 MMHG | WEIGHT: 248 LBS | OXYGEN SATURATION: 99 % | HEART RATE: 85 BPM

## 2024-04-01 DIAGNOSIS — I10 ESSENTIAL HYPERTENSION: ICD-10-CM

## 2024-04-01 DIAGNOSIS — Z00.00 MEDICARE ANNUAL WELLNESS VISIT, SUBSEQUENT: Primary | ICD-10-CM

## 2024-04-01 PROCEDURE — 3074F SYST BP LT 130 MM HG: CPT | Performed by: FAMILY MEDICINE

## 2024-04-01 PROCEDURE — 3078F DIAST BP <80 MM HG: CPT | Performed by: FAMILY MEDICINE

## 2024-04-01 PROCEDURE — G0439 PPPS, SUBSEQ VISIT: HCPCS | Performed by: FAMILY MEDICINE

## 2024-04-01 RX ORDER — AMLODIPINE BESYLATE 10 MG/1
10 TABLET ORAL DAILY
Qty: 90 TABLET | Refills: 1 | Status: SHIPPED | OUTPATIENT
Start: 2024-04-01

## 2024-04-01 ASSESSMENT — PATIENT HEALTH QUESTIONNAIRE - PHQ9
1. LITTLE INTEREST OR PLEASURE IN DOING THINGS: NOT AT ALL
SUM OF ALL RESPONSES TO PHQ QUESTIONS 1-9: 0
SUM OF ALL RESPONSES TO PHQ9 QUESTIONS 1 & 2: 0
SUM OF ALL RESPONSES TO PHQ QUESTIONS 1-9: 0
SUM OF ALL RESPONSES TO PHQ QUESTIONS 1-9: 0
2. FEELING DOWN, DEPRESSED OR HOPELESS: NOT AT ALL
SUM OF ALL RESPONSES TO PHQ QUESTIONS 1-9: 0

## 2024-04-01 ASSESSMENT — LIFESTYLE VARIABLES
HOW OFTEN DO YOU HAVE A DRINK CONTAINING ALCOHOL: MONTHLY OR LESS
HOW MANY STANDARD DRINKS CONTAINING ALCOHOL DO YOU HAVE ON A TYPICAL DAY: 1 OR 2

## 2024-04-01 NOTE — PROGRESS NOTES
- information provided            Objective   Vitals:    04/01/24 0816   BP: 117/78   Pulse: 85   Temp: 97.2 °F (36.2 °C)   SpO2: 99%   Weight: 112.5 kg (248 lb)   Height: 1.499 m (4' 11.02\")      Body mass index is 50.06 kg/m².      General Appearance: alert and oriented to person, place and time, well-developed and well-nourished, in no acute distress  Pulmonary/Chest: clear to auscultation bilaterally- no wheezes, rales or rhonchi, normal air movement, no respiratory distress  Cardiovascular: normal rate, normal S1 and S2, and no gallops       Allergies   Allergen Reactions    Fish-Derived Products Other (See Comments)    Penicillins Hives    Pneumococcal Vaccine Other (See Comments)     Prior to Visit Medications    Medication Sig Taking? Authorizing Provider   amLODIPine (NORVASC) 10 MG tablet Take 1 tablet by mouth daily Yes Roxann Vinson MD   folic acid (FOLVITE) 1 MG tablet Take 1 tablet by mouth daily Yes Jovita Donato MD   vitamin D (ERGOCALCIFEROL) 1.25 MG (46889 UT) CAPS capsule Take 1 capsule by mouth once a week Yes Jovita Donato MD   Multiple Vitamins-Minerals (MULTIVITAMIN ADULT, MINERALS,) TABS Take 1 tablet by mouth Daily Yes Jovita Donato MD   hydroCHLOROthiazide (HYDRODIURIL) 12.5 MG tablet Take 1 tablet by mouth daily Yes Eda Galindo S, DO   sertraline (ZOLOFT) 100 MG tablet TAKE 1 TABLET BY MOUTH ONCE DAILY Yes Fabio Ariza MD   OLANZapine (ZYPREXA) 10 MG tablet TAKE 1 TABLET BY MOUTH ONCE DAILY Yes ProviderFabio MD       CareTeam (Including outside providers/suppliers regularly involved in providing care):   Patient Care Team:  Roxann Vinson MD as PCP - General (Family Medicine)  Roxann Vinson MD as PCP - Empaneled Provider  Bhavani Koehler MD (Psychiatry)  Toby Hunt as Health      Reviewed and updated this visit:  Allergies  Meds          (Please note that portions of this note were completed with a voice recognition program. Efforts were

## 2024-04-01 NOTE — PATIENT INSTRUCTIONS
if you are taking prescription pain medicine. Begin exercising as soon as possible once you have healed; most weight loss centers will design an exercise program for you.  Once you are home, it is important to eat and drink exactly what your doctor and dietitian recommend. You will see your doctor, nurse, and dietitian on a regular basis after surgery to monitor your health, diet, and weight loss.   You will be able to slowly increase how much you eat over time, although it will always be important to:  Eat small, frequent meals and not skip meals   Chew your food slowly and completely   Avoid eating while \"distracted\" (such as eating while watching TV)   Stop eating when you feel full   Drink liquids at least 30 minutes before or after eating   Avoid foods high in fat or sugar   Take vitamin supplements, as recommended  It can take several months to learn to listen to your body so that you know when you are hungry and when you are full. You may dislike foods you previously loved, and you may begin to prefer new foods. This can be a frustrating process for some people, so talk to your dietitian if you are having trouble.  It usually takes between one and two years to lose weight after surgery. After reaching their goal weight, some people have plastic surgery (called \"body contouring\") to remove excess skin from the body, particularly in the abdominal area.  Before you decide to have weight loss surgery, you must commit to staying healthy for life. This includes following up with your healthcare team, exercising most days of the week, and eating a sensible diet every day. It can be difficult to develop new eating and exercise habits after weight loss surgery, and you will have to work hard to stick to your goals.  Recovering from surgery and losing weight can be stressful and emotional, and it is important to have the support of family and friends. Working with a , therapist, or support group can help

## 2024-05-20 ENCOUNTER — TELEPHONE (OUTPATIENT)
Dept: FAMILY MEDICINE CLINIC | Age: 49
End: 2024-05-20

## 2024-05-20 RX ORDER — CANDESARTAN 32 MG/1
32 TABLET ORAL DAILY
Qty: 30 TABLET | Refills: 0 | Status: SHIPPED | OUTPATIENT
Start: 2024-05-20

## 2024-05-20 NOTE — TELEPHONE ENCOUNTER
Please stop the medication I will order./Norvasc as the medication could cause the increased swelling in her lower extremities.  I will call in an additional blood pressure medication to her pharmacy.  Please make sure to follow-up this week or next week for blood pressure check.  Thank you.

## 2024-05-20 NOTE — TELEPHONE ENCOUNTER
Patient called she started a new BP medication and now has swollen feet/ ankles. Is wondering what to do was on a water pill. She is wondering if she should be doing home remedies or what should she do. Please advise.

## 2024-06-11 RX ORDER — CANDESARTAN 32 MG/1
32 TABLET ORAL DAILY
Qty: 30 TABLET | Refills: 0 | Status: SHIPPED | OUTPATIENT
Start: 2024-06-11

## 2024-07-03 ENCOUNTER — OFFICE VISIT (OUTPATIENT)
Dept: FAMILY MEDICINE CLINIC | Age: 49
End: 2024-07-03

## 2024-07-03 VITALS
WEIGHT: 255 LBS | OXYGEN SATURATION: 99 % | DIASTOLIC BLOOD PRESSURE: 78 MMHG | TEMPERATURE: 97.4 F | SYSTOLIC BLOOD PRESSURE: 118 MMHG | HEART RATE: 98 BPM | BODY MASS INDEX: 51.48 KG/M2

## 2024-07-03 DIAGNOSIS — I10 ESSENTIAL HYPERTENSION: Primary | ICD-10-CM

## 2024-07-03 DIAGNOSIS — I87.2 VENOUS INSUFFICIENCY OF LOWER EXTREMITY: ICD-10-CM

## 2024-07-03 RX ORDER — FUROSEMIDE 20 MG/1
20 TABLET ORAL DAILY PRN
Qty: 20 TABLET | Refills: 0 | Status: SHIPPED | OUTPATIENT
Start: 2024-07-03

## 2024-07-03 NOTE — PROGRESS NOTES
packs/day: 1.00     Average packs/day: 1 pack/day for 3.0 years (3.0 ttl pk-yrs)     Types: Cigars, Cigarettes    Smokeless tobacco: Never   Substance Use Topics    Alcohol use: No      Body mass index is 51.48 kg/m².  /78   Pulse 98   Temp 97.4 °F (36.3 °C)   Wt 115.7 kg (255 lb)   LMP 02/28/2024   SpO2 99%   BMI 51.48 kg/m²     Subjective:      HPI    49 y.o. female coming in today because of ankle swelling.  The patient was on amlodipine.  The medication was changed to candesartan and hydrochlorothiazide.  Blood pressure well-controlled today.    Patient is dates that the swelling and lower extremity is getting better but not resolved.  She states that she has been having still been getting up in the morning and being on her feet the whole day swelling.  But the swelling is gone after good night rest.  She states and she admits that she uses salt she goes to [facility].  She does not use compression stockings.  She would like to have a water pill.    Review of Systems   Constitutional: Negative for fever and unexpected weight change.   Pertinent items are noted in HPI.    Objective:   Physical Exam  Constitutional: VS (see above).   General appearance: normal development, habitus and attention, no deformities. No distress.  Eyes: normal conjunctiva and lids.  CAV: RRR, no RMG.  2+ lower extremities.  Pulmo: CTA bilateral, no CWR.  Skin: no rashes, lesions or ulcers.  Musculoskeletal: normal gait. Nails: no clubbing or cyanosis.  Psychiatric: alert and oriented to place, time and person. Normal mood and affect.    Assessment:       Diagnosis Orders   1. Essential hypertension        2. Venous insufficiency of lower extremity            Plan:   Blood pressure well-controlled we will continue current care.  Discussed with patient I will call in [employer] to use if needed.  Also discussed with her to not use any salt at all and use compression stockings/knee-high.  Patient will call and let me know if any

## 2024-07-14 DIAGNOSIS — E55.9 VITAMIN D DEFICIENCY: ICD-10-CM

## 2024-07-14 RX ORDER — ERGOCALCIFEROL 1.25 MG/1
50000 CAPSULE ORAL WEEKLY
Qty: 12 CAPSULE | Refills: 0 | OUTPATIENT
Start: 2024-07-14

## 2024-07-22 RX ORDER — CANDESARTAN 32 MG/1
32 TABLET ORAL DAILY
Qty: 30 TABLET | Refills: 3 | Status: SHIPPED | OUTPATIENT
Start: 2024-07-22

## 2024-08-05 ENCOUNTER — OFFICE VISIT (OUTPATIENT)
Dept: FAMILY MEDICINE CLINIC | Age: 49
End: 2024-08-05

## 2024-08-05 VITALS
WEIGHT: 253 LBS | DIASTOLIC BLOOD PRESSURE: 88 MMHG | SYSTOLIC BLOOD PRESSURE: 138 MMHG | BODY MASS INDEX: 51.07 KG/M2 | TEMPERATURE: 97.2 F | HEART RATE: 90 BPM | OXYGEN SATURATION: 99 %

## 2024-08-05 DIAGNOSIS — M75.81 RIGHT ROTATOR CUFF TENDINITIS: Primary | ICD-10-CM

## 2024-08-05 RX ORDER — NAPROXEN 500 MG/1
500 TABLET ORAL 2 TIMES DAILY WITH MEALS
Qty: 60 TABLET | Refills: 0 | Status: SHIPPED | OUTPATIENT
Start: 2024-08-05

## 2024-08-05 RX ORDER — METHOCARBAMOL 750 MG/1
750 TABLET, FILM COATED ORAL 4 TIMES DAILY
Qty: 40 TABLET | Refills: 0 | Status: SHIPPED | OUTPATIENT
Start: 2024-08-05 | End: 2024-08-15

## 2024-08-05 ASSESSMENT — PATIENT HEALTH QUESTIONNAIRE - PHQ9
1. LITTLE INTEREST OR PLEASURE IN DOING THINGS: NOT AT ALL
SUM OF ALL RESPONSES TO PHQ9 QUESTIONS 1 & 2: 0
SUM OF ALL RESPONSES TO PHQ QUESTIONS 1-9: 0
2. FEELING DOWN, DEPRESSED OR HOPELESS: NOT AT ALL
SUM OF ALL RESPONSES TO PHQ QUESTIONS 1-9: 0

## 2024-08-05 NOTE — PROGRESS NOTES
General FM note    Avani Smith is a 49 y.o. female who presents today for follow up on her  medical conditions as noted below.  Avani Smith is c/o of   Chief Complaint   Patient presents with    Arm Pain     Left arm and shoulder  5-6 days       Patient Active Problem List:     Schizophrenia (HCC)     Class 3 severe obesity in adult (HCC)     Essential hypertension     Tobacco abuse disorder     Tobacco abuse counseling     Folate deficiency     Vitamin D deficiency     B12 deficiency     Venous insufficiency of lower extremity     Past Medical History:   Diagnosis Date    Anxiety     Depression     Hypertension       No past surgical history on file.  Family History   Problem Relation Age of Onset    Arthritis Mother     Asthma Mother     Gout Mother     High Blood Pressure Mother     Arthritis Father     Cancer Father     High Blood Pressure Father     Prostate Cancer Father      Current Outpatient Medications   Medication Sig Dispense Refill    methocarbamol (ROBAXIN-750) 750 MG tablet Take 1 tablet by mouth 4 times daily for 10 days 40 tablet 0    naproxen (NAPROSYN) 500 MG tablet Take 1 tablet by mouth 2 times daily (with meals) 60 tablet 0    candesartan (ATACAND) 32 MG tablet Take 1 tablet by mouth once daily 30 tablet 3    furosemide (LASIX) 20 MG tablet Take 1 tablet by mouth daily as needed (swelling) 20 tablet 0    folic acid (FOLVITE) 1 MG tablet Take 1 tablet by mouth daily 90 tablet 5    vitamin D (ERGOCALCIFEROL) 1.25 MG (13905 UT) CAPS capsule Take 1 capsule by mouth once a week 12 capsule 2    Multiple Vitamins-Minerals (MULTIVITAMIN ADULT, MINERALS,) TABS Take 1 tablet by mouth Daily 90 tablet 5    hydroCHLOROthiazide (HYDRODIURIL) 12.5 MG tablet Take 1 tablet by mouth daily 90 tablet 1    sertraline (ZOLOFT) 100 MG tablet TAKE 1 TABLET BY MOUTH ONCE DAILY      OLANZapine (ZYPREXA) 10 MG tablet TAKE 1 TABLET BY MOUTH ONCE DAILY       No current facility-administered medications for this

## 2024-09-16 RX ORDER — CANDESARTAN 32 MG/1
32 TABLET ORAL DAILY
Qty: 100 TABLET | Refills: 1 | Status: SHIPPED | OUTPATIENT
Start: 2024-09-16

## 2024-09-28 SDOH — ECONOMIC STABILITY: FOOD INSECURITY: WITHIN THE PAST 12 MONTHS, THE FOOD YOU BOUGHT JUST DIDN'T LAST AND YOU DIDN'T HAVE MONEY TO GET MORE.: NEVER TRUE

## 2024-09-28 SDOH — ECONOMIC STABILITY: FOOD INSECURITY: WITHIN THE PAST 12 MONTHS, YOU WORRIED THAT YOUR FOOD WOULD RUN OUT BEFORE YOU GOT MONEY TO BUY MORE.: NEVER TRUE

## 2024-09-28 SDOH — ECONOMIC STABILITY: INCOME INSECURITY: HOW HARD IS IT FOR YOU TO PAY FOR THE VERY BASICS LIKE FOOD, HOUSING, MEDICAL CARE, AND HEATING?: NOT VERY HARD

## 2024-09-28 SDOH — ECONOMIC STABILITY: TRANSPORTATION INSECURITY
IN THE PAST 12 MONTHS, HAS LACK OF TRANSPORTATION KEPT YOU FROM MEETINGS, WORK, OR FROM GETTING THINGS NEEDED FOR DAILY LIVING?: NO

## 2024-10-01 ENCOUNTER — OFFICE VISIT (OUTPATIENT)
Dept: FAMILY MEDICINE CLINIC | Age: 49
End: 2024-10-01

## 2024-10-01 VITALS
HEART RATE: 99 BPM | DIASTOLIC BLOOD PRESSURE: 82 MMHG | SYSTOLIC BLOOD PRESSURE: 124 MMHG | WEIGHT: 250.6 LBS | TEMPERATURE: 97 F | BODY MASS INDEX: 50.59 KG/M2 | OXYGEN SATURATION: 99 %

## 2024-10-01 DIAGNOSIS — Z23 NEED FOR SHINGLES VACCINE: ICD-10-CM

## 2024-10-01 DIAGNOSIS — I10 ESSENTIAL HYPERTENSION: Primary | ICD-10-CM

## 2024-10-01 RX ORDER — ZOSTER VACCINE RECOMBINANT, ADJUVANTED 50 MCG/0.5
0.5 KIT INTRAMUSCULAR SEE ADMIN INSTRUCTIONS
Qty: 0.5 ML | Refills: 1 | Status: SHIPPED | OUTPATIENT
Start: 2024-10-01 | End: 2025-03-30

## 2024-10-01 ASSESSMENT — PATIENT HEALTH QUESTIONNAIRE - PHQ9
1. LITTLE INTEREST OR PLEASURE IN DOING THINGS: NOT AT ALL
SUM OF ALL RESPONSES TO PHQ QUESTIONS 1-9: 0
2. FEELING DOWN, DEPRESSED OR HOPELESS: NOT AT ALL
SUM OF ALL RESPONSES TO PHQ QUESTIONS 1-9: 0
SUM OF ALL RESPONSES TO PHQ9 QUESTIONS 1 & 2: 0

## 2024-10-01 NOTE — PROGRESS NOTES
CWR.  Skin: no rashes, lesions or ulcers.  Musculoskeletal: normal gait. Nails: no clubbing or cyanosis.  Psychiatric: alert and oriented to place, time and person. Normal mood and affect.    Assessment:       Diagnosis Orders   1. Essential hypertension        2. Need for shingles vaccine  zoster recombinant adjuvanted vaccine (SHINGRIX) 50 MCG/0.5ML SUSR injection          Plan:   Blood pressure looks quite good.  I discussed with patient exercise and diet.  The patient will call if any changes or concerns  Return in about 6 months (around 4/1/2025), or if symptoms worsen or fail to improve.  No orders of the defined types were placed in this encounter.    Orders Placed This Encounter   Medications    zoster recombinant adjuvanted vaccine (SHINGRIX) 50 MCG/0.5ML SUSR injection     Sig: Inject 0.5 mLs into the muscle See Admin Instructions 1 dose now and repeat in 2-6 months     Dispense:  0.5 mL     Refill:  1       Call or return to clinic prn if these symptoms worsen or fail to improve as anticipated.  I have reviewed the instructions with the patient, answering all questions to patient's satisfaction.      Avani received counseling on the following healthy behaviors: nutrition, exercise, and medication adherence  Reviewed prior labs and health maintenance.  Continue current medications, diet and exercise.  Discussed use, benefit, and side effects of prescribed medications. Barriers to medication compliance addressed.   Patient given educational materials - see patient instructions.    All patient questions answered.  Patient voiced understanding.      Electronically signed by Roxann Vinson MD on 10/1/2024 at 9:36 AM       (Please note that portions of this note were completed with a voice recognition program. Efforts were made to edit the dictations but occasionally words are mis-transcribed.)

## 2025-01-16 DIAGNOSIS — E53.8 FOLATE DEFICIENCY: ICD-10-CM

## 2025-01-16 RX ORDER — FOLIC ACID 1 MG/1
1000 TABLET ORAL DAILY
Qty: 90 TABLET | Refills: 0 | Status: SHIPPED | OUTPATIENT
Start: 2025-01-16

## 2025-01-20 ENCOUNTER — OFFICE VISIT (OUTPATIENT)
Dept: FAMILY MEDICINE CLINIC | Age: 50
End: 2025-01-20

## 2025-01-20 VITALS
TEMPERATURE: 97.8 F | DIASTOLIC BLOOD PRESSURE: 86 MMHG | BODY MASS INDEX: 50.6 KG/M2 | HEART RATE: 94 BPM | HEIGHT: 59 IN | WEIGHT: 251 LBS | SYSTOLIC BLOOD PRESSURE: 134 MMHG | RESPIRATION RATE: 15 BRPM | OXYGEN SATURATION: 99 %

## 2025-01-20 DIAGNOSIS — Z87.891 PERSONAL HISTORY OF TOBACCO USE: ICD-10-CM

## 2025-01-20 DIAGNOSIS — E78.5 DYSLIPIDEMIA: ICD-10-CM

## 2025-01-20 DIAGNOSIS — Z13.6 ENCOUNTER FOR LIPID SCREENING FOR CARDIOVASCULAR DISEASE: ICD-10-CM

## 2025-01-20 DIAGNOSIS — I10 ESSENTIAL HYPERTENSION: ICD-10-CM

## 2025-01-20 DIAGNOSIS — Z00.00 MEDICARE ANNUAL WELLNESS VISIT, SUBSEQUENT: Primary | ICD-10-CM

## 2025-01-20 DIAGNOSIS — Z12.12 SCREENING FOR COLORECTAL CANCER: ICD-10-CM

## 2025-01-20 DIAGNOSIS — Z13.220 ENCOUNTER FOR LIPID SCREENING FOR CARDIOVASCULAR DISEASE: ICD-10-CM

## 2025-01-20 DIAGNOSIS — F20.9 SCHIZOPHRENIA, UNSPECIFIED TYPE (HCC): ICD-10-CM

## 2025-01-20 DIAGNOSIS — Z12.11 SCREENING FOR COLORECTAL CANCER: ICD-10-CM

## 2025-01-20 DIAGNOSIS — Z13.1 DIABETES MELLITUS SCREENING: ICD-10-CM

## 2025-01-20 SDOH — ECONOMIC STABILITY: FOOD INSECURITY: WITHIN THE PAST 12 MONTHS, THE FOOD YOU BOUGHT JUST DIDN'T LAST AND YOU DIDN'T HAVE MONEY TO GET MORE.: NEVER TRUE

## 2025-01-20 SDOH — ECONOMIC STABILITY: FOOD INSECURITY: WITHIN THE PAST 12 MONTHS, YOU WORRIED THAT YOUR FOOD WOULD RUN OUT BEFORE YOU GOT MONEY TO BUY MORE.: NEVER TRUE

## 2025-01-20 ASSESSMENT — PATIENT HEALTH QUESTIONNAIRE - PHQ9
SUM OF ALL RESPONSES TO PHQ QUESTIONS 1-9: 0
SUM OF ALL RESPONSES TO PHQ9 QUESTIONS 1 & 2: 0
1. LITTLE INTEREST OR PLEASURE IN DOING THINGS: NOT AT ALL
SUM OF ALL RESPONSES TO PHQ QUESTIONS 1-9: 0
SUM OF ALL RESPONSES TO PHQ QUESTIONS 1-9: 0
2. FEELING DOWN, DEPRESSED OR HOPELESS: NOT AT ALL
SUM OF ALL RESPONSES TO PHQ QUESTIONS 1-9: 0

## 2025-01-20 ASSESSMENT — LIFESTYLE VARIABLES
HOW OFTEN DO YOU HAVE A DRINK CONTAINING ALCOHOL: NEVER
HOW MANY STANDARD DRINKS CONTAINING ALCOHOL DO YOU HAVE ON A TYPICAL DAY: PATIENT DOES NOT DRINK

## 2025-01-20 NOTE — PROGRESS NOTES
Medicare Annual Wellness Visit    Avani Smith is here for Medicare AWV    Assessment & Plan   Medicare annual wellness visit, subsequent  Essential hypertension  -     CBC with Auto Differential; Future  -     Comprehensive Metabolic Panel; Future  -     TSH reflex to FT4; Future  -     Lipid Panel; Future  -     Hemoglobin A1C; Future  -     Urinalysis; Future  Dyslipidemia  -     CBC with Auto Differential; Future  -     Comprehensive Metabolic Panel; Future  -     TSH reflex to FT4; Future  -     Lipid Panel; Future  -     Hemoglobin A1C; Future  -     Urinalysis; Future  Encounter for lipid screening for cardiovascular disease  -     Lipid Panel; Future  Diabetes mellitus screening  -     Hemoglobin A1C; Future  Schizophrenia, unspecified type (HCC)  Personal history of tobacco use  -     IA VISIT TO DISCUSS LUNG CA SCREEN W LDCT  -     CT Lung Screen (Initial/Annual/Baseline); Future  Screening for colorectal cancer  -     [facility]     Overall the patient is doing well.  Yearly blood work ordered.  Discussed with her she needs to continue her blood pressure medication including the candesartan 32 mg daily as well as the Lasix 20 mg as needed.  Blood pressure today looks quite good.  She will let me know if there are any changes.  She will continue to follow-up with the psychiatrist every 3 months.  Call or return to clinic prn if these symptoms worsen or fail to improve as anticipated.  I have reviewed the instructions with the patient, answering all questions to her satisfaction.    Return in about 6 months (around 7/20/2025), or if symptoms worsen or fail to improve.     Subjective   The following acute and/or chronic problems were also addressed today:    History of Present Illness  The patient presents for a Medicare physical exam.    She reports an elevated blood pressure reading of 179/124, which was recorded during a psychiatric visit approximately 2 to 3 weeks ago. Multiple readings were

## 2025-01-20 NOTE — PATIENT INSTRUCTIONS
is never too late to quit. Try to avoid secondhand smoke too.     Stay at a weight that's healthy for you. Talk to your doctor if you need help losing weight.     Try to get 7 to 9 hours of sleep each night.     Limit alcohol to 2 drinks a day for men and 1 drink a day for women. Too much alcohol can cause health problems.     Manage other health problems such as diabetes, high blood pressure, and high cholesterol. If you think you may have a problem with alcohol or drug use, talk to your doctor.   Medicines    Take your medicines exactly as prescribed. Call your doctor if you think you are having a problem with your medicine.     If your doctor recommends aspirin, take the amount directed each day. Make sure you take aspirin and not another kind of pain reliever, such as acetaminophen (Tylenol).   When should you call for help?   Call 911 if you have symptoms of a heart attack. These may include:    Chest pain or pressure, or a strange feeling in the chest.     Sweating.     Shortness of breath.     Pain, pressure, or a strange feeling in the back, neck, jaw, or upper belly or in one or both shoulders or arms.     Lightheadedness or sudden weakness.     A fast or irregular heartbeat.   After you call 911, the  may tell you to chew 1 adult-strength or 2 to 4 low-dose aspirin. Wait for an ambulance. Do not try to drive yourself.  Watch closely for changes in your health, and be sure to contact your doctor if you have any problems.  Where can you learn more?  Go to https://www.[employer].net/patientEd and enter F075 to learn more about \"A Healthy Heart: Care Instructions.\"  Current as of: July 31, 2024  Content Version: 14.3  © 2024 [employer].   Care instructions adapted under license by [employer] If you have questions about a medical condition or this instruction, always ask your healthcare professional. Training [employer], [employer], disclaims any warranty or liability for your use of this

## 2025-02-05 LAB — NONINV COLON CA DNA+OCC BLD SCRN STL QL: NEGATIVE

## 2025-02-18 ENCOUNTER — OFFICE VISIT (OUTPATIENT)
Dept: OBGYN CLINIC | Age: 50
End: 2025-02-18
Payer: MEDICARE

## 2025-02-18 VITALS
HEIGHT: 59 IN | SYSTOLIC BLOOD PRESSURE: 140 MMHG | DIASTOLIC BLOOD PRESSURE: 90 MMHG | HEART RATE: 89 BPM | BODY MASS INDEX: 50.72 KG/M2 | WEIGHT: 251.6 LBS

## 2025-02-18 DIAGNOSIS — E53.8 B12 DEFICIENCY: ICD-10-CM

## 2025-02-18 DIAGNOSIS — Z12.31 SCREENING MAMMOGRAM FOR BREAST CANCER: ICD-10-CM

## 2025-02-18 DIAGNOSIS — Z01.419 WELL WOMAN EXAM WITH ROUTINE GYNECOLOGICAL EXAM: Primary | ICD-10-CM

## 2025-02-18 PROCEDURE — G8417 CALC BMI ABV UP PARAM F/U: HCPCS | Performed by: STUDENT IN AN ORGANIZED HEALTH CARE EDUCATION/TRAINING PROGRAM

## 2025-02-18 PROCEDURE — G0101 CA SCREEN;PELVIC/BREAST EXAM: HCPCS | Performed by: STUDENT IN AN ORGANIZED HEALTH CARE EDUCATION/TRAINING PROGRAM

## 2025-02-18 PROCEDURE — G8427 DOCREV CUR MEDS BY ELIG CLIN: HCPCS | Performed by: STUDENT IN AN ORGANIZED HEALTH CARE EDUCATION/TRAINING PROGRAM

## 2025-02-18 NOTE — PROGRESS NOTES
Chaperone for Intimate Exam  Chaperone was offered as part of the rooming process. Patient declined and agrees to continue with exam without a chaperone.  Chaperone: none      
She has a 39 pack-year smoking history. She has never used smokeless tobacco. She reports that she does not drink alcohol and does not use drugs.    HEALTH MAINTENANCE:  Immunization status: up to date and documented    ROUTINE GYN HEALTH MAINTENANCE  Mammogram: BIRADS 2 3/24. Next due 3/25.  Colonoscopy: Cologuard neg 1/25.  Pap Smears: Last 12/23 Neg w/ Neg HPV. Next due 12/28.  Family Planning: Menopausal  DEXA: N/A    VITALS:  Vitals:    02/18/25 1035 02/18/25 1055   BP: (!) 172/111 (!) 140/90   Site: Left Upper Arm Right Lower Arm   Position: Sitting Sitting   Cuff Size: Large Adult Medium Adult   Pulse: 89    Weight: 114.1 kg (251 lb 9.6 oz)    Height: 1.499 m (4' 11.02\")                                                                                                                                                                                                    PHYSICAL EXAM: (Patient declined chaperone)    General Appearance: Appears healthy.  Alert; in no acute distress.  Pleasant.  Skin: Skin color, texture, turgor normal. No rashes or lesions.  HEENT: normocephalic and atraumatic, Thyroid normal to inspection and palpation  Respiratory: Normal expansion.  Clear to auscultation.  No rales, rhonchi, or wheezing.  Cardiovascular: normal rate, normal S1 and S2, no gallops, intact distal pulses and no carotid bruits  Breast:  (Chest): normal appearance, no masses or tenderness  Abdomen: soft, non-tender, non-distended, no right upper quadrant tenderness and no CVA tenderness  Pelvic Exam:   External genitalia: General appearance; normal, Hair distribution; normal, Lesions absent  Urinary system: urethral meatus normal  Vaginal: normal mucosa, no discharge  Cervix: normal appearing cervix without discharge or lesions  Adnexa: non-palpable  Uterus: normal single, nontender  Rectal Exam: exam declined by patient  Musculoskeletal: no gross abnormalities  Extremities: non-tender BLE and non-edematous  Psych:

## 2025-02-18 NOTE — TELEPHONE ENCOUNTER
Avani Smith is calling to request a refill on the following medication(s):    Last Visit Date (If Applicable):  1/20/2025    Next Visit Date:    4/21/2025    Medication Request:  Requested Prescriptions     Pending Prescriptions Disp Refills    Multiple Vitamins-Minerals (MULTIVITAMIN ADULT, MINERALS,) TABS 90 tablet 5     Sig: Take 1 tablet by mouth Daily

## 2025-03-03 ENCOUNTER — HOSPITAL ENCOUNTER (OUTPATIENT)
Dept: CT IMAGING | Age: 50
Discharge: HOME | End: 2025-03-05
Payer: MEDICARE

## 2025-03-03 VITALS — WEIGHT: 251 LBS | HEIGHT: 59 IN | BODY MASS INDEX: 50.6 KG/M2

## 2025-03-03 DIAGNOSIS — Z87.891 PERSONAL HISTORY OF TOBACCO USE: ICD-10-CM

## 2025-03-03 PROCEDURE — 71271 CT THORAX LUNG CANCER SCR C-: CPT

## 2025-03-05 ENCOUNTER — HOSPITAL ENCOUNTER (OUTPATIENT)
Age: 50
Setting detail: SPECIMEN
Discharge: HOME | End: 2025-03-05

## 2025-03-05 DIAGNOSIS — Z13.1 DIABETES MELLITUS SCREENING: ICD-10-CM

## 2025-03-05 DIAGNOSIS — Z13.220 ENCOUNTER FOR LIPID SCREENING FOR CARDIOVASCULAR DISEASE: ICD-10-CM

## 2025-03-05 DIAGNOSIS — Z13.6 ENCOUNTER FOR LIPID SCREENING FOR CARDIOVASCULAR DISEASE: ICD-10-CM

## 2025-03-05 DIAGNOSIS — I10 ESSENTIAL HYPERTENSION: ICD-10-CM

## 2025-03-05 DIAGNOSIS — E78.5 DYSLIPIDEMIA: ICD-10-CM

## 2025-03-05 LAB
ALBUMIN SERPL-MCNC: 4 G/DL (ref 3.5–5.2)
ALBUMIN/GLOB SERPL: 1.3 {RATIO} (ref 1–2.5)
ALP SERPL-CCNC: 115 U/L (ref 35–104)
ALT SERPL-CCNC: 20 U/L (ref 10–35)
ANION GAP SERPL CALCULATED.3IONS-SCNC: 10 MMOL/L (ref 9–16)
AST SERPL-CCNC: 29 U/L (ref 10–35)
BACTERIA URNS QL MICRO: NORMAL
BASOPHILS # BLD: <0.03 K/UL (ref 0–0.2)
BASOPHILS NFR BLD: 1 % (ref 0–2)
BILIRUB SERPL-MCNC: 0.8 MG/DL (ref 0–1.2)
BILIRUB UR QL STRIP: ABNORMAL
BUN SERPL-MCNC: 6 MG/DL (ref 6–20)
CALCIUM SERPL-MCNC: 9 MG/DL (ref 8.6–10.4)
CASTS #/AREA URNS LPF: NORMAL /LPF (ref 0–8)
CHLORIDE SERPL-SCNC: 100 MMOL/L (ref 98–107)
CHOLEST SERPL-MCNC: 162 MG/DL (ref 0–199)
CHOLESTEROL/HDL RATIO: 2.2
CLARITY UR: ABNORMAL
CO2 SERPL-SCNC: 29 MMOL/L (ref 20–31)
COLOR UR: ABNORMAL
CREAT SERPL-MCNC: 0.9 MG/DL (ref 0.6–0.9)
EOSINOPHIL # BLD: 0.08 K/UL (ref 0–0.44)
EOSINOPHILS RELATIVE PERCENT: 2 % (ref 1–4)
EPI CELLS #/AREA URNS HPF: NORMAL /HPF (ref 0–5)
ERYTHROCYTE [DISTWIDTH] IN BLOOD BY AUTOMATED COUNT: 13 % (ref 11.8–14.4)
EST. AVERAGE GLUCOSE BLD GHB EST-MCNC: 114 MG/DL
GFR, ESTIMATED: 78 ML/MIN/1.73M2
GLUCOSE SERPL-MCNC: 91 MG/DL (ref 74–99)
GLUCOSE UR STRIP-MCNC: NEGATIVE MG/DL
HBA1C MFR BLD: 5.6 % (ref 4–6)
HCT VFR BLD AUTO: 46.3 % (ref 36.3–47.1)
HDLC SERPL-MCNC: 75 MG/DL
HGB BLD-MCNC: 15.5 G/DL (ref 11.9–15.1)
HGB UR QL STRIP.AUTO: NEGATIVE
IMM GRANULOCYTES # BLD AUTO: <0.03 K/UL (ref 0–0.3)
IMM GRANULOCYTES NFR BLD: 1 %
KETONES UR STRIP-MCNC: ABNORMAL MG/DL
LDLC SERPL CALC-MCNC: 73 MG/DL (ref 0–100)
LEUKOCYTE ESTERASE UR QL STRIP: ABNORMAL
LYMPHOCYTES NFR BLD: 1.15 K/UL (ref 1.1–3.7)
LYMPHOCYTES RELATIVE PERCENT: 32 % (ref 24–43)
MCH RBC QN AUTO: 32.6 PG (ref 25.2–33.5)
MCHC RBC AUTO-ENTMCNC: 33.5 G/DL (ref 28.4–34.8)
MCV RBC AUTO: 97.3 FL (ref 82.6–102.9)
MONOCYTES NFR BLD: 0.27 K/UL (ref 0.1–1.2)
MONOCYTES NFR BLD: 8 % (ref 3–12)
NEUTROPHILS NFR BLD: 56 % (ref 36–65)
NEUTS SEG NFR BLD: 2.07 K/UL (ref 1.5–8.1)
NITRITE UR QL STRIP: NEGATIVE
NRBC BLD-RTO: 0 PER 100 WBC
PH UR STRIP: 6.5 [PH] (ref 5–8)
PLATELET # BLD AUTO: 207 K/UL (ref 138–453)
PMV BLD AUTO: 9.9 FL (ref 8.1–13.5)
POTASSIUM SERPL-SCNC: 4 MMOL/L (ref 3.7–5.3)
PROT SERPL-MCNC: 7.2 G/DL (ref 6.6–8.7)
PROT UR STRIP-MCNC: ABNORMAL MG/DL
RBC # BLD AUTO: 4.76 M/UL (ref 3.95–5.11)
RBC #/AREA URNS HPF: NORMAL /HPF (ref 0–4)
SODIUM SERPL-SCNC: 139 MMOL/L (ref 136–145)
SP GR UR STRIP: 1.03 (ref 1–1.03)
TRIGL SERPL-MCNC: 72 MG/DL
TSH SERPL DL<=0.05 MIU/L-ACNC: 0.46 UIU/ML (ref 0.27–4.2)
UROBILINOGEN UR STRIP-ACNC: NORMAL EU/DL (ref 0–1)
VLDLC SERPL CALC-MCNC: 14 MG/DL (ref 1–30)
WBC #/AREA URNS HPF: NORMAL /HPF (ref 0–5)
WBC OTHER # BLD: 3.6 K/UL (ref 3.5–11.3)

## 2025-03-06 ENCOUNTER — PATIENT MESSAGE (OUTPATIENT)
Dept: FAMILY MEDICINE CLINIC | Age: 50
End: 2025-03-06

## 2025-03-08 ENCOUNTER — HOSPITAL ENCOUNTER (OUTPATIENT)
Dept: MAMMOGRAPHY | Age: 50
Discharge: HOME | End: 2025-03-10
Payer: MEDICARE

## 2025-03-08 DIAGNOSIS — Z12.31 SCREENING MAMMOGRAM FOR BREAST CANCER: ICD-10-CM

## 2025-03-08 PROCEDURE — 77063 BREAST TOMOSYNTHESIS BI: CPT

## 2025-03-10 ENCOUNTER — RESULTS FOLLOW-UP (OUTPATIENT)
Dept: MAMMOGRAPHY | Age: 50
End: 2025-03-10

## 2025-03-20 ENCOUNTER — HOSPITAL ENCOUNTER (OUTPATIENT)
Age: 50
Discharge: HOME | End: 2025-03-22
Payer: MEDICARE

## 2025-03-20 VITALS — BODY MASS INDEX: 50.6 KG/M2 | WEIGHT: 251 LBS | HEIGHT: 59 IN

## 2025-03-20 DIAGNOSIS — E78.5 DYSLIPIDEMIA: ICD-10-CM

## 2025-03-20 DIAGNOSIS — R93.89 ABNORMAL CT OF THE CHEST: ICD-10-CM

## 2025-03-20 DIAGNOSIS — I10 ESSENTIAL HYPERTENSION: ICD-10-CM

## 2025-03-20 DIAGNOSIS — Z13.6 ENCOUNTER FOR SCREENING FOR CARDIOVASCULAR DISORDERS: ICD-10-CM

## 2025-03-20 LAB
ECHO AO ROOT DIAM: 2.2 CM
ECHO AO ROOT INDEX: 1.08 CM/M2
ECHO AV MEAN GRADIENT: 7 MMHG
ECHO AV MEAN VELOCITY: 1.2 M/S
ECHO AV PEAK GRADIENT: 14 MMHG
ECHO AV PEAK VELOCITY: 1.9 M/S
ECHO AV VELOCITY RATIO: 0.53
ECHO AV VTI: 35.7 CM
ECHO BSA: 2.18 M2
ECHO LA AREA 2C: 14.2 CM2
ECHO LA AREA 4C: 18.8 CM2
ECHO LA DIAMETER INDEX: 1.77 CM/M2
ECHO LA DIAMETER: 3.6 CM
ECHO LA MAJOR AXIS: 5.5 CM
ECHO LA MINOR AXIS: 4.8 CM
ECHO LA TO AORTIC ROOT RATIO: 1.64
ECHO LA VOL BP: 46 ML (ref 22–52)
ECHO LA VOL MOD A2C: 35 ML (ref 22–52)
ECHO LA VOL MOD A4C: 53 ML (ref 22–52)
ECHO LA VOL/BSA BIPLANE: 23 ML/M2 (ref 16–34)
ECHO LA VOLUME INDEX MOD A2C: 17 ML/M2 (ref 16–34)
ECHO LA VOLUME INDEX MOD A4C: 26 ML/M2 (ref 16–34)
ECHO LV E' LATERAL VELOCITY: 4.46 CM/S
ECHO LV E' SEPTAL VELOCITY: 4.68 CM/S
ECHO LV EDV A2C: 71 ML
ECHO LV EDV A4C: 73 ML
ECHO LV EDV INDEX A4C: 36 ML/M2
ECHO LV EDV NDEX A2C: 35 ML/M2
ECHO LV EF PHYSICIAN: 58 %
ECHO LV EJECTION FRACTION A2C: 61 %
ECHO LV EJECTION FRACTION A4C: 55 %
ECHO LV EJECTION FRACTION BIPLANE: 58 % (ref 55–100)
ECHO LV ESV A2C: 28 ML
ECHO LV ESV A4C: 33 ML
ECHO LV ESV INDEX A2C: 14 ML/M2
ECHO LV ESV INDEX A4C: 16 ML/M2
ECHO LV FRACTIONAL SHORTENING: 36 % (ref 28–44)
ECHO LV INTERNAL DIMENSION DIASTOLE INDEX: 2.46 CM/M2
ECHO LV INTERNAL DIMENSION DIASTOLIC: 5 CM (ref 3.9–5.3)
ECHO LV INTERNAL DIMENSION SYSTOLIC INDEX: 1.58 CM/M2
ECHO LV INTERNAL DIMENSION SYSTOLIC: 3.2 CM
ECHO LV IVSD: 1.1 CM (ref 0.6–0.9)
ECHO LV MASS 2D: 207.1 G (ref 67–162)
ECHO LV MASS INDEX 2D: 102 G/M2 (ref 43–95)
ECHO LV POSTERIOR WALL DIASTOLIC: 1.1 CM (ref 0.6–0.9)
ECHO LV RELATIVE WALL THICKNESS RATIO: 0.44
ECHO LVOT PEAK GRADIENT: 4 MMHG
ECHO LVOT PEAK VELOCITY: 1 M/S
ECHO MV A VELOCITY: 1.05 M/S
ECHO MV E DECELERATION TIME (DT): 183 MS
ECHO MV E VELOCITY: 0.7 M/S
ECHO MV E/A RATIO: 0.67
ECHO MV E/E' LATERAL: 15.7
ECHO MV E/E' RATIO (AVERAGED): 15.33
ECHO MV E/E' SEPTAL: 14.96

## 2025-03-20 PROCEDURE — 93306 TTE W/DOPPLER COMPLETE: CPT

## 2025-03-21 ENCOUNTER — RESULTS FOLLOW-UP (OUTPATIENT)
Age: 50
End: 2025-03-21

## 2025-04-07 RX ORDER — CANDESARTAN 32 MG/1
32 TABLET ORAL DAILY
Qty: 100 TABLET | Refills: 0 | Status: SHIPPED | OUTPATIENT
Start: 2025-04-07

## 2025-04-07 NOTE — TELEPHONE ENCOUNTER
Avani Smith is calling to request a refill on the following medication(s):    Last Visit Date (If Applicable):  1/20/2025    Next Visit Date:    4/21/2025    Medication Request:  Requested Prescriptions     Pending Prescriptions Disp Refills    candesartan (ATACAND) 32 MG tablet [Pharmacy Med Name: Candesartan Cilexetil 32 MG Oral Tablet] 100 tablet 0     Sig: Take 1 tablet by mouth once daily

## 2025-04-17 DIAGNOSIS — E53.8 FOLATE DEFICIENCY: ICD-10-CM

## 2025-04-17 RX ORDER — FOLIC ACID 1 MG/1
1000 TABLET ORAL DAILY
Qty: 90 TABLET | Refills: 0 | Status: SHIPPED | OUTPATIENT
Start: 2025-04-17

## 2025-04-18 SDOH — HEALTH STABILITY: PHYSICAL HEALTH: ON AVERAGE, HOW MANY DAYS PER WEEK DO YOU ENGAGE IN MODERATE TO STRENUOUS EXERCISE (LIKE A BRISK WALK)?: 2 DAYS

## 2025-04-18 SDOH — ECONOMIC STABILITY: FOOD INSECURITY: WITHIN THE PAST 12 MONTHS, YOU WORRIED THAT YOUR FOOD WOULD RUN OUT BEFORE YOU GOT MONEY TO BUY MORE.: NEVER TRUE

## 2025-04-18 SDOH — HEALTH STABILITY: PHYSICAL HEALTH: ON AVERAGE, HOW MANY MINUTES DO YOU ENGAGE IN EXERCISE AT THIS LEVEL?: 10 MIN

## 2025-04-18 SDOH — ECONOMIC STABILITY: TRANSPORTATION INSECURITY
IN THE PAST 12 MONTHS, HAS THE LACK OF TRANSPORTATION KEPT YOU FROM MEDICAL APPOINTMENTS OR FROM GETTING MEDICATIONS?: NO

## 2025-04-18 SDOH — ECONOMIC STABILITY: INCOME INSECURITY: IN THE LAST 12 MONTHS, WAS THERE A TIME WHEN YOU WERE NOT ABLE TO PAY THE MORTGAGE OR RENT ON TIME?: NO

## 2025-04-18 SDOH — ECONOMIC STABILITY: FOOD INSECURITY: WITHIN THE PAST 12 MONTHS, THE FOOD YOU BOUGHT JUST DIDN'T LAST AND YOU DIDN'T HAVE MONEY TO GET MORE.: NEVER TRUE

## 2025-04-18 ASSESSMENT — PATIENT HEALTH QUESTIONNAIRE - PHQ9
SUM OF ALL RESPONSES TO PHQ QUESTIONS 1-9: 0
SUM OF ALL RESPONSES TO PHQ QUESTIONS 1-9: 0
1. LITTLE INTEREST OR PLEASURE IN DOING THINGS: NOT AT ALL
SUM OF ALL RESPONSES TO PHQ QUESTIONS 1-9: 0
SUM OF ALL RESPONSES TO PHQ QUESTIONS 1-9: 0
2. FEELING DOWN, DEPRESSED OR HOPELESS: NOT AT ALL

## 2025-04-18 ASSESSMENT — LIFESTYLE VARIABLES
HOW OFTEN DO YOU HAVE A DRINK CONTAINING ALCOHOL: 2
HOW MANY STANDARD DRINKS CONTAINING ALCOHOL DO YOU HAVE ON A TYPICAL DAY: 1 OR 2
HOW OFTEN DO YOU HAVE A DRINK CONTAINING ALCOHOL: MONTHLY OR LESS
HOW OFTEN DO YOU HAVE SIX OR MORE DRINKS ON ONE OCCASION: 1
HOW MANY STANDARD DRINKS CONTAINING ALCOHOL DO YOU HAVE ON A TYPICAL DAY: 1

## 2025-04-21 ENCOUNTER — OFFICE VISIT (OUTPATIENT)
Dept: FAMILY MEDICINE CLINIC | Age: 50
End: 2025-04-21
Payer: MEDICARE

## 2025-04-21 VITALS
HEART RATE: 89 BPM | TEMPERATURE: 97 F | HEIGHT: 59 IN | SYSTOLIC BLOOD PRESSURE: 158 MMHG | DIASTOLIC BLOOD PRESSURE: 108 MMHG | WEIGHT: 249 LBS | BODY MASS INDEX: 50.2 KG/M2 | OXYGEN SATURATION: 99 %

## 2025-04-21 DIAGNOSIS — E78.5 DYSLIPIDEMIA: ICD-10-CM

## 2025-04-21 DIAGNOSIS — I10 PRIMARY HYPERTENSION: Primary | ICD-10-CM

## 2025-04-21 DIAGNOSIS — I51.7 CARDIOMEGALY: ICD-10-CM

## 2025-04-21 DIAGNOSIS — R05.9 COUGH IN ADULT: ICD-10-CM

## 2025-04-21 PROCEDURE — 3017F COLORECTAL CA SCREEN DOC REV: CPT | Performed by: FAMILY MEDICINE

## 2025-04-21 PROCEDURE — G8417 CALC BMI ABV UP PARAM F/U: HCPCS | Performed by: FAMILY MEDICINE

## 2025-04-21 PROCEDURE — 1036F TOBACCO NON-USER: CPT | Performed by: FAMILY MEDICINE

## 2025-04-21 PROCEDURE — 3077F SYST BP >= 140 MM HG: CPT | Performed by: FAMILY MEDICINE

## 2025-04-21 PROCEDURE — 3080F DIAST BP >= 90 MM HG: CPT | Performed by: FAMILY MEDICINE

## 2025-04-21 PROCEDURE — G8427 DOCREV CUR MEDS BY ELIG CLIN: HCPCS | Performed by: FAMILY MEDICINE

## 2025-04-21 PROCEDURE — 99214 OFFICE O/P EST MOD 30 MIN: CPT | Performed by: FAMILY MEDICINE

## 2025-04-21 RX ORDER — FLUTICASONE PROPIONATE 50 MCG
1 SPRAY, SUSPENSION (ML) NASAL DAILY
Qty: 16 G | Refills: 2 | Status: SHIPPED | OUTPATIENT
Start: 2025-04-21

## 2025-04-21 RX ORDER — MONTELUKAST SODIUM 10 MG/1
10 TABLET ORAL DAILY
Qty: 30 TABLET | Refills: 3 | Status: SHIPPED | OUTPATIENT
Start: 2025-04-21

## 2025-04-21 RX ORDER — AMLODIPINE BESYLATE 10 MG/1
10 TABLET ORAL DAILY
Qty: 90 TABLET | Refills: 0 | Status: SHIPPED | OUTPATIENT
Start: 2025-04-21

## 2025-04-21 NOTE — PROGRESS NOTES
General FM note    Avani Smith is a 49 y.o. female who presents today for follow up on her  medical conditions as noted below.  Avani Smith is c/o of   Chief Complaint   Patient presents with    Medicare AWV     Htn      Cough     Sinuses/mucous       Patient Active Problem List:     Schizophrenia     Class 3 severe obesity in adult     Essential hypertension     Tobacco abuse disorder     Tobacco abuse counseling     Folate deficiency     Vitamin D deficiency     B12 deficiency     Venous insufficiency of lower extremity     Past Medical History:   Diagnosis Date    Anxiety     Depression     Hypertension       No past surgical history on file.  Family History   Problem Relation Age of Onset    Arthritis Mother     Asthma Mother     Gout Mother     High Blood Pressure Mother     Arthritis Father     Cancer Father     High Blood Pressure Father     Prostate Cancer Father      Current Outpatient Medications   Medication Sig Dispense Refill    amLODIPine (NORVASC) 10 MG tablet Take 1 tablet by mouth daily 90 tablet 0    fluticasone (FLONASE) 50 MCG/ACT nasal spray 1 spray by Each Nostril route daily 16 g 2    montelukast (SINGULAIR) 10 MG tablet Take 1 tablet by mouth daily 30 tablet 3    folic acid (FOLVITE) 1 MG tablet Take 1 tablet by mouth once daily 90 tablet 0    candesartan (ATACAND) 32 MG tablet Take 1 tablet by mouth once daily 100 tablet 0    Multiple Vitamins-Minerals (MULTIVITAMIN ADULT, MINERALS,) TABS Take 1 tablet by mouth Daily 90 tablet 5    naproxen (NAPROSYN) 500 MG tablet Take 1 tablet by mouth 2 times daily (with meals) 60 tablet 0    furosemide (LASIX) 20 MG tablet Take 1 tablet by mouth daily as needed (swelling) 20 tablet 0    vitamin D (ERGOCALCIFEROL) 1.25 MG (65806 UT) CAPS capsule Take 1 capsule by mouth once a week 12 capsule 2    sertraline (ZOLOFT) 100 MG tablet TAKE 1 TABLET BY MOUTH ONCE DAILY      OLANZapine (ZYPREXA) 10 MG tablet TAKE 1 TABLET BY MOUTH ONCE DAILY       No

## 2025-07-16 DIAGNOSIS — E53.8 FOLATE DEFICIENCY: ICD-10-CM

## 2025-07-16 RX ORDER — FOLIC ACID 1 MG/1
1000 TABLET ORAL DAILY
Qty: 90 TABLET | Refills: 0 | Status: SHIPPED | OUTPATIENT
Start: 2025-07-16

## 2025-07-21 ENCOUNTER — OFFICE VISIT (OUTPATIENT)
Dept: FAMILY MEDICINE CLINIC | Age: 50
End: 2025-07-21

## 2025-07-21 VITALS
BODY MASS INDEX: 46.29 KG/M2 | TEMPERATURE: 97.2 F | SYSTOLIC BLOOD PRESSURE: 127 MMHG | OXYGEN SATURATION: 99 % | DIASTOLIC BLOOD PRESSURE: 88 MMHG | WEIGHT: 245 LBS | HEART RATE: 88 BPM

## 2025-07-21 DIAGNOSIS — I10 PRIMARY HYPERTENSION: ICD-10-CM

## 2025-07-21 DIAGNOSIS — M25.561 CHRONIC PAIN OF RIGHT KNEE: Primary | ICD-10-CM

## 2025-07-21 DIAGNOSIS — G89.29 CHRONIC PAIN OF RIGHT KNEE: Primary | ICD-10-CM

## 2025-07-21 RX ORDER — AMLODIPINE BESYLATE 10 MG/1
10 TABLET ORAL DAILY
Qty: 90 TABLET | Refills: 1 | Status: SHIPPED | OUTPATIENT
Start: 2025-07-21

## 2025-07-21 ASSESSMENT — PATIENT HEALTH QUESTIONNAIRE - PHQ9
2. FEELING DOWN, DEPRESSED OR HOPELESS: NOT AT ALL
SUM OF ALL RESPONSES TO PHQ QUESTIONS 1-9: 0
SUM OF ALL RESPONSES TO PHQ QUESTIONS 1-9: 0
1. LITTLE INTEREST OR PLEASURE IN DOING THINGS: NOT AT ALL
SUM OF ALL RESPONSES TO PHQ QUESTIONS 1-9: 0
SUM OF ALL RESPONSES TO PHQ QUESTIONS 1-9: 0

## 2025-07-21 NOTE — PROGRESS NOTES
General FM note    Avani Smith is a 50 y.o. female who presents today for follow up on her  medical conditions as noted below.  Avani Smith is c/o of   Chief Complaint   Patient presents with    Hypertension     3 month       Patient Active Problem List:     Schizophrenia (HCC)     Class 3 severe obesity in adult (HCC)     Essential hypertension     Tobacco abuse disorder     Tobacco abuse counseling     Folate deficiency     Vitamin D deficiency     B12 deficiency     Venous insufficiency of lower extremity     Past Medical History:   Diagnosis Date    Anxiety     Depression     Hypertension       No past surgical history on file.  Family History   Problem Relation Age of Onset    Arthritis Mother     Asthma Mother     Gout Mother     High Blood Pressure Mother     Arthritis Father     Cancer Father     High Blood Pressure Father     Prostate Cancer Father      Current Outpatient Medications   Medication Sig Dispense Refill    amLODIPine (NORVASC) 10 MG tablet Take 1 tablet by mouth daily 90 tablet 1    folic acid (FOLVITE) 1 MG tablet Take 1 tablet by mouth once daily 90 tablet 0    fluticasone (FLONASE) 50 MCG/ACT nasal spray 1 spray by Each Nostril route daily 16 g 2    montelukast (SINGULAIR) 10 MG tablet Take 1 tablet by mouth daily 30 tablet 3    Multiple Vitamins-Minerals (MULTIVITAMIN ADULT, MINERALS,) TABS Take 1 tablet by mouth Daily 90 tablet 5    sertraline (ZOLOFT) 100 MG tablet TAKE 1 TABLET BY MOUTH ONCE DAILY      OLANZapine (ZYPREXA) 10 MG tablet TAKE 1 TABLET BY MOUTH ONCE DAILY       No current facility-administered medications for this visit.     ALLERGIES:    Allergies   Allergen Reactions    Fish-Derived Products Other (See Comments)    Penicillins Hives    Pneumococcal Vaccine Other (See Comments)       Social History     Tobacco Use    Smoking status: Former     Current packs/day: 0.00     Average packs/day: 1.4 packs/day for 27.0 years (39.0 ttl pk-yrs)     Types: Cigars, Cigarettes

## 2025-08-21 DIAGNOSIS — R05.9 COUGH IN ADULT: ICD-10-CM

## 2025-08-21 RX ORDER — MONTELUKAST SODIUM 10 MG/1
10 TABLET ORAL DAILY
Qty: 30 TABLET | Refills: 0 | Status: SHIPPED | OUTPATIENT
Start: 2025-08-21